# Patient Record
Sex: MALE | Race: WHITE | NOT HISPANIC OR LATINO | Employment: OTHER | URBAN - METROPOLITAN AREA
[De-identification: names, ages, dates, MRNs, and addresses within clinical notes are randomized per-mention and may not be internally consistent; named-entity substitution may affect disease eponyms.]

---

## 2019-01-11 ENCOUNTER — HOSPITAL ENCOUNTER (OUTPATIENT)
Dept: GASTROENTEROLOGY | Facility: HOSPITAL | Age: 64
Setting detail: HOSPITAL OUTPATIENT SURGERY
Discharge: HOME | End: 2019-01-11
Attending: INTERNAL MEDICINE

## 2019-03-08 ENCOUNTER — HOSPITAL ENCOUNTER (OUTPATIENT)
Dept: GENERAL RADIOLOGY | Facility: HOSPITAL | Age: 64
Discharge: HOME | End: 2019-03-08
Attending: INTERNAL MEDICINE

## 2020-04-07 ENCOUNTER — HOSPITAL ENCOUNTER (OUTPATIENT)
Dept: LAB | Facility: HOSPITAL | Age: 65
Discharge: HOME | End: 2020-04-07
Attending: INTERNAL MEDICINE

## 2020-04-09 LAB
CONV QUANTIFERON TB GOLD: NEGATIVE
QUANTIFERON CRITERIA: NORMAL
QUANTIFERON MITOGEN VALUE: >10 IU/ML
QUANTIFERON NIL VALUE: 0.07 IU/ML
QUANTIFERON TB1 AG VALUE: 0.08 IU/ML
QUANTIFERON TB2 AG VALUE: 0.07 IU/ML

## 2020-07-02 ENCOUNTER — HOSPITAL ENCOUNTER (OUTPATIENT)
Dept: GENERAL RADIOLOGY | Facility: HOSPITAL | Age: 65
Discharge: HOME | End: 2020-07-02
Attending: NURSE PRACTITIONER

## 2020-11-04 ENCOUNTER — HOSPITAL ENCOUNTER (OUTPATIENT)
Dept: LAB | Facility: HOSPITAL | Age: 65
Discharge: HOME | End: 2020-11-04
Attending: INTERNAL MEDICINE

## 2020-11-04 LAB
ALBUMIN SERPL-MCNC: 4.3 G/DL (ref 3.5–5)
ALBUMIN/GLOB SERPL: 1.7 {RATIO} (ref 1.4–2.6)
ALP SERPL-CCNC: 78 U/L (ref 56–155)
ALT SERPL-CCNC: 21 U/L (ref 10–40)
ANION GAP SERPL CALC-SCNC: 17 MMOL/L (ref 8–19)
AST SERPL-CCNC: 22 U/L (ref 15–50)
BASOPHILS # BLD AUTO: 0.03 10*3/UL (ref 0–0.2)
BASOPHILS NFR BLD AUTO: 0.5 % (ref 0–3)
BILIRUB SERPL-MCNC: 0.45 MG/DL (ref 0.2–1.3)
BUN SERPL-MCNC: 21 MG/DL (ref 5–25)
BUN/CREAT SERPL: 22 {RATIO} (ref 6–20)
CALCIUM SERPL-MCNC: 9.1 MG/DL (ref 8.7–10.4)
CHLORIDE SERPL-SCNC: 105 MMOL/L (ref 99–111)
CONV ABS IMM GRAN: 0.03 10*3/UL (ref 0–0.2)
CONV CO2: 24 MMOL/L (ref 22–32)
CONV IMMATURE GRAN: 0.5 % (ref 0–1.8)
CONV TOTAL PROTEIN: 6.9 G/DL (ref 6.3–8.2)
CREAT UR-MCNC: 0.95 MG/DL (ref 0.7–1.2)
CRP SERPL-MCNC: 6.4 MG/L (ref 0–5)
DEPRECATED RDW RBC AUTO: 46.6 FL (ref 35.1–43.9)
EOSINOPHIL # BLD AUTO: 0.11 10*3/UL (ref 0–0.7)
EOSINOPHIL # BLD AUTO: 1.7 % (ref 0–7)
ERYTHROCYTE [DISTWIDTH] IN BLOOD BY AUTOMATED COUNT: 13.8 % (ref 11.6–14.4)
ERYTHROCYTE [SEDIMENTATION RATE] IN BLOOD: 5 MM/H (ref 0–20)
GFR SERPLBLD BASED ON 1.73 SQ M-ARVRAT: >60 ML/MIN/{1.73_M2}
GLOBULIN UR ELPH-MCNC: 2.6 G/DL (ref 2–3.5)
GLUCOSE SERPL-MCNC: 83 MG/DL (ref 70–99)
HCT VFR BLD AUTO: 44.4 % (ref 42–52)
HGB BLD-MCNC: 14.4 G/DL (ref 14–18)
LYMPHOCYTES # BLD AUTO: 1.86 10*3/UL (ref 1–5)
LYMPHOCYTES NFR BLD AUTO: 29.3 % (ref 20–45)
MCH RBC QN AUTO: 29.8 PG (ref 27–31)
MCHC RBC AUTO-ENTMCNC: 32.4 G/DL (ref 33–37)
MCV RBC AUTO: 91.9 FL (ref 80–96)
MONOCYTES # BLD AUTO: 0.71 10*3/UL (ref 0.2–1.2)
MONOCYTES NFR BLD AUTO: 11.2 % (ref 3–10)
NEUTROPHILS # BLD AUTO: 3.61 10*3/UL (ref 2–8)
NEUTROPHILS NFR BLD AUTO: 56.8 % (ref 30–85)
NRBC CBCN: 0 % (ref 0–0.7)
OSMOLALITY SERPL CALC.SUM OF ELEC: 294 MOSM/KG (ref 273–304)
PLATELET # BLD AUTO: 247 10*3/UL (ref 130–400)
PMV BLD AUTO: 9.7 FL (ref 9.4–12.4)
POTASSIUM SERPL-SCNC: 4.6 MMOL/L (ref 3.5–5.3)
RBC # BLD AUTO: 4.83 10*6/UL (ref 4.7–6.1)
SODIUM SERPL-SCNC: 141 MMOL/L (ref 135–147)
WBC # BLD AUTO: 6.35 10*3/UL (ref 4.8–10.8)

## 2021-02-23 ENCOUNTER — HOSPITAL ENCOUNTER (OUTPATIENT)
Dept: VACCINE CLINIC | Facility: HOSPITAL | Age: 66
Discharge: HOME | End: 2021-02-23
Attending: INTERNAL MEDICINE

## 2021-03-19 ENCOUNTER — HOSPITAL ENCOUNTER (OUTPATIENT)
Dept: VACCINE CLINIC | Facility: HOSPITAL | Age: 66
Discharge: HOME | End: 2021-03-19
Attending: INTERNAL MEDICINE

## 2021-12-20 ENCOUNTER — HOSPITAL ENCOUNTER (OUTPATIENT)
Dept: GENERAL RADIOLOGY | Facility: HOSPITAL | Age: 66
Discharge: HOME | End: 2021-12-20
Admitting: FAMILY MEDICINE

## 2021-12-20 ENCOUNTER — TRANSCRIBE ORDERS (OUTPATIENT)
Dept: GENERAL RADIOLOGY | Facility: HOSPITAL | Age: 66
End: 2021-12-20

## 2021-12-20 DIAGNOSIS — R07.89 OTHER CHEST PAIN: ICD-10-CM

## 2021-12-20 DIAGNOSIS — R07.89 OTHER CHEST PAIN: Primary | ICD-10-CM

## 2021-12-20 PROCEDURE — 71101 X-RAY EXAM UNILAT RIBS/CHEST: CPT

## 2021-12-21 ENCOUNTER — TRANSCRIBE ORDERS (OUTPATIENT)
Dept: ADMINISTRATIVE | Facility: HOSPITAL | Age: 66
End: 2021-12-21

## 2021-12-21 DIAGNOSIS — R07.89 OTHER CHEST PAIN: Primary | ICD-10-CM

## 2022-01-05 ENCOUNTER — HOSPITAL ENCOUNTER (OUTPATIENT)
Dept: CT IMAGING | Facility: HOSPITAL | Age: 67
Discharge: HOME | End: 2022-01-05
Admitting: FAMILY MEDICINE

## 2022-01-05 DIAGNOSIS — R07.89 OTHER CHEST PAIN: ICD-10-CM

## 2022-01-05 LAB — CREAT BLDA-MCNC: 1.1 MG/DL

## 2022-01-05 PROCEDURE — 82565 ASSAY OF CREATININE: CPT

## 2022-01-05 PROCEDURE — 0 IOPAMIDOL PER 1 ML: Performed by: FAMILY MEDICINE

## 2022-01-05 PROCEDURE — 71260 CT THORAX DX C+: CPT

## 2022-01-05 RX ADMIN — IOPAMIDOL 100 ML: 755 INJECTION, SOLUTION INTRAVENOUS at 16:30

## 2022-08-03 ENCOUNTER — HOSPITAL ENCOUNTER (OUTPATIENT)
Dept: GENERAL RADIOLOGY | Facility: HOSPITAL | Age: 67
Discharge: HOME | End: 2022-08-03
Admitting: FAMILY MEDICINE

## 2022-08-03 ENCOUNTER — TRANSCRIBE ORDERS (OUTPATIENT)
Dept: GENERAL RADIOLOGY | Facility: HOSPITAL | Age: 67
End: 2022-08-03

## 2022-08-03 DIAGNOSIS — R22.1 LOCALIZED SWELLING, MASS AND LUMP, NECK: Primary | ICD-10-CM

## 2022-08-03 DIAGNOSIS — R22.1 LOCALIZED SWELLING, MASS AND LUMP, NECK: ICD-10-CM

## 2022-08-03 PROCEDURE — 73000 X-RAY EXAM OF COLLAR BONE: CPT

## 2022-08-31 ENCOUNTER — HOSPITAL ENCOUNTER (OUTPATIENT)
Dept: GENERAL RADIOLOGY | Facility: HOSPITAL | Age: 67
Discharge: HOME | End: 2022-08-31
Admitting: FAMILY MEDICINE

## 2022-08-31 ENCOUNTER — TRANSCRIBE ORDERS (OUTPATIENT)
Dept: GENERAL RADIOLOGY | Facility: HOSPITAL | Age: 67
End: 2022-08-31

## 2022-08-31 DIAGNOSIS — M17.11 UNILATERAL PRIMARY OSTEOARTHRITIS, RIGHT KNEE: Primary | ICD-10-CM

## 2022-08-31 DIAGNOSIS — M17.11 UNILATERAL PRIMARY OSTEOARTHRITIS, RIGHT KNEE: ICD-10-CM

## 2022-08-31 PROCEDURE — 73562 X-RAY EXAM OF KNEE 3: CPT

## 2022-11-16 ENCOUNTER — TRANSCRIBE ORDERS (OUTPATIENT)
Dept: LAB | Facility: HOSPITAL | Age: 67
End: 2022-11-16

## 2022-11-16 ENCOUNTER — LAB (OUTPATIENT)
Dept: LAB | Facility: HOSPITAL | Age: 67
End: 2022-11-16

## 2022-11-16 DIAGNOSIS — M45.AB NON-RADIOGRAPHIC AXIAL SPONDYLOARTHRITIS OF MULTIPLE SITES IN SPINE: ICD-10-CM

## 2022-11-16 DIAGNOSIS — M45.AB NON-RADIOGRAPHIC AXIAL SPONDYLOARTHRITIS OF MULTIPLE SITES IN SPINE: Primary | ICD-10-CM

## 2022-11-16 LAB
ALBUMIN SERPL-MCNC: 4.3 G/DL (ref 3.5–5.2)
ALBUMIN/GLOB SERPL: 2 G/DL
ALP SERPL-CCNC: 76 U/L (ref 39–117)
ALT SERPL W P-5'-P-CCNC: 11 U/L (ref 1–41)
ANION GAP SERPL CALCULATED.3IONS-SCNC: 9 MMOL/L (ref 5–15)
AST SERPL-CCNC: 19 U/L (ref 1–40)
BASOPHILS # BLD AUTO: 0.02 10*3/MM3 (ref 0–0.2)
BASOPHILS NFR BLD AUTO: 0.3 % (ref 0–1.5)
BILIRUB SERPL-MCNC: 0.3 MG/DL (ref 0–1.2)
BUN SERPL-MCNC: 16 MG/DL (ref 8–23)
BUN/CREAT SERPL: 20 (ref 7–25)
CALCIUM SPEC-SCNC: 9.3 MG/DL (ref 8.6–10.5)
CHLORIDE SERPL-SCNC: 105 MMOL/L (ref 98–107)
CO2 SERPL-SCNC: 28 MMOL/L (ref 22–29)
CREAT SERPL-MCNC: 0.8 MG/DL (ref 0.76–1.27)
CRP SERPL-MCNC: 0.4 MG/DL (ref 0–0.5)
DEPRECATED RDW RBC AUTO: 42.1 FL (ref 37–54)
EGFRCR SERPLBLD CKD-EPI 2021: 97.6 ML/MIN/1.73
EOSINOPHIL # BLD AUTO: 0.12 10*3/MM3 (ref 0–0.4)
EOSINOPHIL NFR BLD AUTO: 1.9 % (ref 0.3–6.2)
ERYTHROCYTE [DISTWIDTH] IN BLOOD BY AUTOMATED COUNT: 12.8 % (ref 12.3–15.4)
ERYTHROCYTE [SEDIMENTATION RATE] IN BLOOD: 11 MM/HR (ref 0–20)
GLOBULIN UR ELPH-MCNC: 2.2 GM/DL
GLUCOSE SERPL-MCNC: 86 MG/DL (ref 65–99)
HCT VFR BLD AUTO: 39.9 % (ref 37.5–51)
HGB BLD-MCNC: 13.3 G/DL (ref 13–17.7)
IMM GRANULOCYTES # BLD AUTO: 0.02 10*3/MM3 (ref 0–0.05)
IMM GRANULOCYTES NFR BLD AUTO: 0.3 % (ref 0–0.5)
LYMPHOCYTES # BLD AUTO: 1.97 10*3/MM3 (ref 0.7–3.1)
LYMPHOCYTES NFR BLD AUTO: 30.4 % (ref 19.6–45.3)
MCH RBC QN AUTO: 30.2 PG (ref 26.6–33)
MCHC RBC AUTO-ENTMCNC: 33.3 G/DL (ref 31.5–35.7)
MCV RBC AUTO: 90.5 FL (ref 79–97)
MONOCYTES # BLD AUTO: 0.76 10*3/MM3 (ref 0.1–0.9)
MONOCYTES NFR BLD AUTO: 11.7 % (ref 5–12)
NEUTROPHILS NFR BLD AUTO: 3.58 10*3/MM3 (ref 1.7–7)
NEUTROPHILS NFR BLD AUTO: 55.4 % (ref 42.7–76)
NRBC BLD AUTO-RTO: 0 /100 WBC (ref 0–0.2)
PLATELET # BLD AUTO: 242 10*3/MM3 (ref 140–450)
PMV BLD AUTO: 10 FL (ref 6–12)
POTASSIUM SERPL-SCNC: 4.8 MMOL/L (ref 3.5–5.2)
PROT SERPL-MCNC: 6.5 G/DL (ref 6–8.5)
RBC # BLD AUTO: 4.41 10*6/MM3 (ref 4.14–5.8)
SODIUM SERPL-SCNC: 142 MMOL/L (ref 136–145)
WBC NRBC COR # BLD: 6.47 10*3/MM3 (ref 3.4–10.8)

## 2022-11-16 PROCEDURE — 85652 RBC SED RATE AUTOMATED: CPT

## 2022-11-16 PROCEDURE — 36415 COLL VENOUS BLD VENIPUNCTURE: CPT

## 2022-11-16 PROCEDURE — 86140 C-REACTIVE PROTEIN: CPT

## 2022-11-16 PROCEDURE — 85025 COMPLETE CBC W/AUTO DIFF WBC: CPT

## 2022-11-16 PROCEDURE — 80053 COMPREHEN METABOLIC PANEL: CPT

## 2022-12-06 PROBLEM — M02.30 REACTIVE ARTHRITIS: Status: ACTIVE | Noted: 2020-03-07

## 2023-02-27 ENCOUNTER — HOSPITAL ENCOUNTER (OUTPATIENT)
Dept: GENERAL RADIOLOGY | Facility: HOSPITAL | Age: 68
Discharge: HOME | End: 2023-02-27
Admitting: FAMILY MEDICINE
Payer: MEDICARE

## 2023-02-27 ENCOUNTER — TRANSCRIBE ORDERS (OUTPATIENT)
Dept: GENERAL RADIOLOGY | Facility: HOSPITAL | Age: 68
End: 2023-02-27
Payer: MEDICARE

## 2023-02-27 DIAGNOSIS — M25.571 PAIN IN JOINT OF RIGHT ANKLE: Primary | ICD-10-CM

## 2023-02-27 DIAGNOSIS — M25.571 PAIN IN JOINT OF RIGHT ANKLE: ICD-10-CM

## 2023-02-27 PROCEDURE — 73610 X-RAY EXAM OF ANKLE: CPT

## 2023-04-18 ENCOUNTER — TRANSCRIBE ORDERS (OUTPATIENT)
Dept: ADMINISTRATIVE | Facility: HOSPITAL | Age: 68
End: 2023-04-18
Payer: MEDICARE

## 2023-04-18 DIAGNOSIS — M25.571 ACUTE RIGHT ANKLE PAIN: Primary | ICD-10-CM

## 2023-12-28 ENCOUNTER — PREP FOR SURGERY (OUTPATIENT)
Dept: OTHER | Facility: HOSPITAL | Age: 68
End: 2023-12-28
Payer: MEDICARE

## 2023-12-28 ENCOUNTER — CLINICAL SUPPORT (OUTPATIENT)
Dept: GASTROENTEROLOGY | Facility: CLINIC | Age: 68
End: 2023-12-28
Payer: MEDICARE

## 2023-12-28 DIAGNOSIS — Z86.010 HISTORY OF COLON POLYPS: ICD-10-CM

## 2023-12-28 DIAGNOSIS — Z12.11 ENCOUNTER FOR SCREENING FOR MALIGNANT NEOPLASM OF COLON: Primary | ICD-10-CM

## 2023-12-28 PROBLEM — Z86.0100 HISTORY OF COLON POLYPS: Status: ACTIVE | Noted: 2023-12-28

## 2023-12-28 RX ORDER — ERGOCALCIFEROL (VITAMIN D2) 10 MCG
400 TABLET ORAL DAILY
COMMUNITY

## 2023-12-28 RX ORDER — SODIUM, POTASSIUM,MAG SULFATES 17.5-3.13G
1 SOLUTION, RECONSTITUTED, ORAL ORAL EVERY 12 HOURS
Qty: 354 ML | Refills: 0 | Status: SHIPPED | OUTPATIENT
Start: 2023-12-28

## 2023-12-28 NOTE — PROGRESS NOTES
Candido Alfredo  1955  68 y.o.    Reason for call: Recall- 5 yr recall, hx colon polyps, last colon 2019- [facility]  Prep prescribed: Suprep  Prep instructions reviewed with patient and sent to patient via regular mail to the home address on file  Is the patient currently on any injectable medications for weight loss or diabetes? No  Clearance needed? No  If yes, what clearance is needed? N/A  Clearance has been requested from N/A  The patient has been scheduled for: Colonoscopy  After your procedure, you will be contacted with results. Please confirm the best phone # to reach the patient: [phone]  Family history of colon cancer? No  If yes, indicate relative: N/A  Family History   Problem Relation Age of Onset    Colon cancer Neg Hx      Past Medical History:   Diagnosis Date    Colon polyp     Hyperlipidemia 2023    Taking atorvastatin 40 mg one per day     No Known Allergies  Past Surgical History:   Procedure Laterality Date    COLONOSCOPY  5 years ago    JOINT REPLACEMENT      2009    TONSILLECTOMY      UPPER GASTROINTESTINAL ENDOSCOPY  Na    Slight heitial hernia     Social History     Socioeconomic History    Marital status:    Tobacco Use    Smoking status: Never     Passive exposure: Never    Smokeless tobacco: Never   Vaping Use    Vaping Use: Never used   Substance and Sexual Activity    Alcohol use: Not Currently    Drug use: Never    Sexual activity: Yes     Partners: Female     Birth control/protection: None       Current Outpatient Medications:     atorvastatin (LIPITOR) 40 MG tablet, , Disp: , Rfl:     Cimzia 2 X 200 MG/ML Prefilled Syringe Kit, , Disp: , Rfl:     diclofenac-miSOPROStol (Arthrotec) 50-0.2 MG EC tablet, Arthrotec 50  mg-mcg oral tablet,IR,delayed rel,biphasic take 1 tablet by oral route 2 times per day   Active, Disp: , Rfl:     loratadine (Claritin) 10 MG tablet, Claritin 10 mg oral tablet take 1 tablet (10 mg) by oral route once daily   Active, Disp: , Rfl:      methylPREDNISolone (MEDROL) 4 MG dose pack, Take as directed on package instructions., Disp: 21 tablet, Rfl: 0    tadalafil (Cialis) 10 MG tablet, Cialis 10 mg oral tablet take 1 tablet (10 mg) by oral route as needed approximately 1 hour before sexual activity   Active, Disp: , Rfl:     Vitamin D, Cholecalciferol, (CHOLECALCIFEROL) 10 MCG (400 UNIT) tablet, Take 1 tablet by mouth Daily. Patient takes 1 every other day, Disp: , Rfl:

## 2024-01-16 ENCOUNTER — TRANSCRIBE ORDERS (OUTPATIENT)
Dept: GENERAL RADIOLOGY | Facility: HOSPITAL | Age: 69
End: 2024-01-16
Payer: MEDICARE

## 2024-01-16 ENCOUNTER — HOSPITAL ENCOUNTER (OUTPATIENT)
Dept: GENERAL RADIOLOGY | Facility: HOSPITAL | Age: 69
Discharge: HOME | End: 2024-01-16
Admitting: INTERNAL MEDICINE
Payer: MEDICARE

## 2024-01-16 DIAGNOSIS — M79.641 PAIN IN RIGHT HAND: Primary | ICD-10-CM

## 2024-01-16 DIAGNOSIS — M79.641 PAIN IN RIGHT HAND: ICD-10-CM

## 2024-01-16 PROCEDURE — 73130 X-RAY EXAM OF HAND: CPT

## 2024-02-12 ENCOUNTER — ANESTHESIA EVENT (OUTPATIENT)
Dept: GASTROENTEROLOGY | Facility: HOSPITAL | Age: 69
End: 2024-02-12
Payer: MEDICARE

## 2024-02-13 ENCOUNTER — ANESTHESIA (OUTPATIENT)
Dept: GASTROENTEROLOGY | Facility: HOSPITAL | Age: 69
End: 2024-02-13
Payer: MEDICARE

## 2024-02-13 ENCOUNTER — HOSPITAL ENCOUNTER (OUTPATIENT)
Facility: HOSPITAL | Age: 69
Setting detail: HOSPITAL OUTPATIENT SURGERY
Discharge: HOME | End: 2024-02-13
Attending: INTERNAL MEDICINE | Admitting: INTERNAL MEDICINE
Payer: MEDICARE

## 2024-02-13 VITALS
TEMPERATURE: 97.5 F | DIASTOLIC BLOOD PRESSURE: 72 MMHG | RESPIRATION RATE: 20 BRPM | WEIGHT: 222.66 LBS | HEART RATE: 64 BPM | SYSTOLIC BLOOD PRESSURE: 105 MMHG | OXYGEN SATURATION: 96 % | BODY MASS INDEX: 31.95 KG/M2

## 2024-02-13 DIAGNOSIS — Z12.11 ENCOUNTER FOR SCREENING FOR MALIGNANT NEOPLASM OF COLON: ICD-10-CM

## 2024-02-13 DIAGNOSIS — Z86.010 HISTORY OF COLON POLYPS: ICD-10-CM

## 2024-02-13 PROCEDURE — 25010000002 PROPOFOL 10 MG/ML EMULSION: Performed by: NURSE ANESTHETIST, CERTIFIED REGISTERED

## 2024-02-13 PROCEDURE — 88305 TISSUE EXAM BY PATHOLOGIST: CPT | Performed by: INTERNAL MEDICINE

## 2024-02-13 PROCEDURE — 25810000003 LACTATED RINGERS PER 1000 ML

## 2024-02-13 RX ORDER — LIDOCAINE HYDROCHLORIDE 20 MG/ML
INJECTION, SOLUTION EPIDURAL; INFILTRATION; INTRACAUDAL; PERINEURAL AS NEEDED
Status: DISCONTINUED | OUTPATIENT
Start: 2024-02-13 | End: 2024-02-13 | Stop reason: SURG

## 2024-02-13 RX ORDER — SODIUM CHLORIDE, SODIUM LACTATE, POTASSIUM CHLORIDE, CALCIUM CHLORIDE 600; 310; 30; 20 MG/100ML; MG/100ML; MG/100ML; MG/100ML
30 INJECTION, SOLUTION INTRAVENOUS CONTINUOUS
Status: DISCONTINUED | OUTPATIENT
Start: 2024-02-13 | End: 2024-02-13 | Stop reason: HOSPADM

## 2024-02-13 RX ORDER — PROPOFOL 10 MG/ML
VIAL (ML) INTRAVENOUS AS NEEDED
Status: DISCONTINUED | OUTPATIENT
Start: 2024-02-13 | End: 2024-02-13 | Stop reason: SURG

## 2024-02-13 RX ADMIN — PROPOFOL 100 MG: 10 INJECTION, EMULSION INTRAVENOUS at 09:00

## 2024-02-13 RX ADMIN — LIDOCAINE HYDROCHLORIDE 100 MG: 20 INJECTION, SOLUTION EPIDURAL; INFILTRATION; INTRACAUDAL; PERINEURAL at 09:00

## 2024-02-13 RX ADMIN — PROPOFOL 250 MCG/KG/MIN: 10 INJECTION, EMULSION INTRAVENOUS at 09:00

## 2024-02-13 RX ADMIN — SODIUM CHLORIDE, POTASSIUM CHLORIDE, SODIUM LACTATE AND CALCIUM CHLORIDE 30 ML/HR: 600; 310; 30; 20 INJECTION, SOLUTION INTRAVENOUS at 07:55

## 2024-02-14 ENCOUNTER — TELEPHONE (OUTPATIENT)
Dept: GASTROENTEROLOGY | Facility: CLINIC | Age: 69
End: 2024-02-14
Payer: MEDICARE

## 2024-02-14 LAB
CYTO UR: NORMAL
LAB AP CASE REPORT: NORMAL
LAB AP CLINICAL INFORMATION: NORMAL
PATH REPORT.FINAL DX SPEC: NORMAL
PATH REPORT.GROSS SPEC: NORMAL

## 2024-12-27 PROBLEM — N52.01 ERECTILE DYSFUNCTION DUE TO ARTERIAL INSUFFICIENCY: Status: ACTIVE | Noted: 2024-12-27

## 2024-12-27 PROBLEM — R97.20 ELEVATED PSA: Status: ACTIVE | Noted: 2024-12-27

## 2024-12-27 NOTE — PROGRESS NOTES
Chief Complaint: Urologic complaint    Subjective         History of Present Illness  Candido Alfredo is a 69 y.o. male       Elevated PSA  ED      Voiding without issue.  No straining  On saw palmetto      Sildenafil 80 mg as needed.   Working okay    11/22 0.8, GFR is 97        No GH    No history of kidney  stone.    No urologic family history  Father with prostate cancer diagnosed in his 60s.    No cardiopulmonary history  Non-smoker  No anticoagulation            PSA    10/24     5.3, percent free 15(23% chance)  1/23       4.0  10/21     5.0              Objective     Past Medical History:   Diagnosis Date    Arthritis     Colon polyp     Hyperlipidemia 2023    Taking atorvastatin 40 mg one per day             Vital Signs:   There were no vitals taken for this visit.     Physical exam    Alert and orient x3  Well appearing, well developed, in no acute distress   Unlabored respirations  Nontender/nondistended      Grossly oriented to person, place and time, judgment is intact, normal mood and affect         Assessment and Plan    Diagnoses and all orders for this visit:    1. Elevated PSA (Primary)    2. Erectile dysfunction due to arterial insufficiency             Elevated PSA      Records reviewed and summarized in the chart      Patient with elevated PSA.  After discussion of risk and benefits we will go ahead and move forward with MRI of prostate to rule out underlying localizing lesion.  Patient voiced understanding           ED      Continue sildenafil as needed through PCP working well.      Follow-up after MRI

## 2024-12-31 ENCOUNTER — OFFICE VISIT (OUTPATIENT)
Dept: UROLOGY | Age: 69
End: 2024-12-31
Payer: MEDICARE

## 2024-12-31 VITALS — BODY MASS INDEX: 32.78 KG/M2 | WEIGHT: 229 LBS | HEIGHT: 70 IN

## 2024-12-31 DIAGNOSIS — N52.01 ERECTILE DYSFUNCTION DUE TO ARTERIAL INSUFFICIENCY: ICD-10-CM

## 2024-12-31 DIAGNOSIS — R97.20 ELEVATED PSA: Primary | ICD-10-CM

## 2024-12-31 PROCEDURE — 1160F RVW MEDS BY RX/DR IN RCRD: CPT | Performed by: UROLOGY

## 2024-12-31 PROCEDURE — 99203 OFFICE O/P NEW LOW 30 MIN: CPT | Performed by: UROLOGY

## 2024-12-31 PROCEDURE — 1159F MED LIST DOCD IN RCRD: CPT | Performed by: UROLOGY

## 2024-12-31 RX ORDER — MAG HYDROX/ALUMINUM HYD/SIMETH 400-400-40
2 SUSPENSION, ORAL (FINAL DOSE FORM) ORAL DAILY
COMMUNITY

## 2024-12-31 RX ORDER — SILDENAFIL CITRATE 20 MG/1
20 TABLET ORAL 4 TIMES DAILY PRN
COMMUNITY
Start: 2024-11-05

## 2024-12-31 RX ORDER — TRIAMCINOLONE ACETONIDE 1 MG/G
1 CREAM TOPICAL AS NEEDED
COMMUNITY
Start: 2024-11-06

## 2024-12-31 RX ORDER — SEMAGLUTIDE 2.4 MG/.75ML
INJECTION, SOLUTION SUBCUTANEOUS
COMMUNITY
Start: 2024-12-10

## 2025-02-17 NOTE — PROGRESS NOTES
Chief Complaint: Urologic complaint    Subjective         History of Present Illness  Candido Alfredo is a 69 y.o. male       Elevated PSA  ED      F/u Today after MRI prostate      Voiding ok   No straining  On saw palmetto      Sildenafil 80 mg as needed.   Working okay        No cardiopulmonary history  Non-smoker  No anticoagulation        Previous      11/22 0.8, GFR is 97    No history of kidney  stone.    No urologic family history  Father with prostate cancer diagnosed in his 60s.          PSA      2/6/2025 MRI prostate - 37 g, PSAd 0.14    PI - RADS 4 - right posterior lateral peripheral zone apex 1.2 x 1.4 cm  PI - RADS 4 - right posterior lateral peripheral zone mid - 0.9 x 0.8 cm  No KATY      10/24     5.3, percent free 15(23% chance)  1/23       4.0  10/21     5.0              Objective            Assessment and Plan    Diagnoses and all orders for this visit:    1. Elevated PSA (Primary)    2. Erectile dysfunction due to arterial insufficiency             Elevated PSA      Patient with a PI - RADS 4 lesion in his prostate I did recommend MRI fusion transrectal prostate biopsy.  Risks and benefits were discussed including bleeding, infection and damage to the urinary system.  We also discussed the risk of anesthesia up to and including death.  Patient voiced understanding and would like to proceed.  Discussed the natural history of prostate cancer and also prostate cancer screening.  We discussed his elevated PSA.  After risk and benefits were discussed the patient would like to proceed with prostate biopsy.  Risk of bleeding in the urine/semen/stool was discussed and also the 3% risk of sepsis.  We discussed the risk of severe rectal bleeding and also the risk of urinary retention. Patient voiced understanding and would like to proceed.    3 days ciprofloxacin given to be taken sophie-procedural        ED      Continue sildenafil as needed through PCP working well.

## 2025-02-21 ENCOUNTER — OFFICE VISIT (OUTPATIENT)
Dept: UROLOGY | Age: 70
End: 2025-02-21
Payer: MEDICARE

## 2025-02-21 ENCOUNTER — PREP FOR SURGERY (OUTPATIENT)
Dept: OTHER | Facility: HOSPITAL | Age: 70
End: 2025-02-21
Payer: MEDICARE

## 2025-02-21 VITALS — BODY MASS INDEX: 32.86 KG/M2 | WEIGHT: 229 LBS

## 2025-02-21 DIAGNOSIS — R97.20 ELEVATED PSA: Primary | ICD-10-CM

## 2025-02-21 DIAGNOSIS — R97.20 ELEVATED PROSTATE SPECIFIC ANTIGEN (PSA): Primary | ICD-10-CM

## 2025-02-21 DIAGNOSIS — N52.01 ERECTILE DYSFUNCTION DUE TO ARTERIAL INSUFFICIENCY: ICD-10-CM

## 2025-02-21 RX ORDER — SODIUM CHLORIDE 0.9 % (FLUSH) 0.9 %
3 SYRINGE (ML) INJECTION EVERY 12 HOURS SCHEDULED
OUTPATIENT
Start: 2025-02-21

## 2025-02-21 RX ORDER — SODIUM CHLORIDE 0.9 % (FLUSH) 0.9 %
10 SYRINGE (ML) INJECTION AS NEEDED
OUTPATIENT
Start: 2025-02-21

## 2025-02-21 RX ORDER — CIPROFLOXACIN 500 MG/1
500 TABLET, FILM COATED ORAL 2 TIMES DAILY
Qty: 6 TABLET | Refills: 0 | Status: SHIPPED | OUTPATIENT
Start: 2025-02-21

## 2025-02-21 RX ORDER — SODIUM CHLORIDE 9 MG/ML
40 INJECTION, SOLUTION INTRAVENOUS AS NEEDED
OUTPATIENT
Start: 2025-02-21

## 2025-03-07 ENCOUNTER — OFFICE VISIT (OUTPATIENT)
Dept: INTERNAL MEDICINE | Facility: CLINIC | Age: 70
End: 2025-03-07
Payer: MEDICARE

## 2025-03-07 VITALS
BODY MASS INDEX: 33.95 KG/M2 | WEIGHT: 237.13 LBS | HEIGHT: 70 IN | HEART RATE: 68 BPM | DIASTOLIC BLOOD PRESSURE: 78 MMHG | OXYGEN SATURATION: 95 % | RESPIRATION RATE: 18 BRPM | SYSTOLIC BLOOD PRESSURE: 122 MMHG | TEMPERATURE: 98.1 F

## 2025-03-07 DIAGNOSIS — M02.39 REACTIVE ARTHRITIS OF MULTIPLE SITES: ICD-10-CM

## 2025-03-07 DIAGNOSIS — R79.9 ABNORMAL FINDING OF BLOOD CHEMISTRY, UNSPECIFIED: ICD-10-CM

## 2025-03-07 DIAGNOSIS — R97.20 ELEVATED PROSTATE SPECIFIC ANTIGEN (PSA): ICD-10-CM

## 2025-03-07 DIAGNOSIS — E78.5 HYPERLIPIDEMIA, UNSPECIFIED HYPERLIPIDEMIA TYPE: Primary | ICD-10-CM

## 2025-03-07 LAB
ALBUMIN SERPL-MCNC: 4.4 G/DL (ref 3.5–5.2)
ALBUMIN/GLOB SERPL: 1.9 G/DL
ALP SERPL-CCNC: 72 U/L (ref 39–117)
ALT SERPL W P-5'-P-CCNC: 42 U/L (ref 1–41)
ANION GAP SERPL CALCULATED.3IONS-SCNC: 9 MMOL/L (ref 5–15)
AST SERPL-CCNC: 24 U/L (ref 1–40)
BASOPHILS # BLD AUTO: 0.04 10*3/MM3 (ref 0–0.2)
BASOPHILS NFR BLD AUTO: 0.6 % (ref 0–1.5)
BILIRUB SERPL-MCNC: 0.5 MG/DL (ref 0–1.2)
BUN SERPL-MCNC: 23 MG/DL (ref 8–23)
BUN/CREAT SERPL: 19.2 (ref 7–25)
CALCIUM SPEC-SCNC: 9.3 MG/DL (ref 8.6–10.5)
CHLORIDE SERPL-SCNC: 105 MMOL/L (ref 98–107)
CHOLEST SERPL-MCNC: 148 MG/DL (ref 0–200)
CO2 SERPL-SCNC: 26 MMOL/L (ref 22–29)
CREAT SERPL-MCNC: 1.2 MG/DL (ref 0.76–1.27)
DEPRECATED RDW RBC AUTO: 43.7 FL (ref 37–54)
EGFRCR SERPLBLD CKD-EPI 2021: 65.5 ML/MIN/1.73
EOSINOPHIL # BLD AUTO: 0.11 10*3/MM3 (ref 0–0.4)
EOSINOPHIL NFR BLD AUTO: 1.6 % (ref 0.3–6.2)
ERYTHROCYTE [DISTWIDTH] IN BLOOD BY AUTOMATED COUNT: 12.7 % (ref 12.3–15.4)
GLOBULIN UR ELPH-MCNC: 2.3 GM/DL
GLUCOSE SERPL-MCNC: 86 MG/DL (ref 65–99)
HBA1C MFR BLD: 5.5 % (ref 4.8–5.6)
HCT VFR BLD AUTO: 44.5 % (ref 37.5–51)
HDLC SERPL-MCNC: 64 MG/DL (ref 40–60)
HGB BLD-MCNC: 14.8 G/DL (ref 13–17.7)
IMM GRANULOCYTES # BLD AUTO: 0.02 10*3/MM3 (ref 0–0.05)
IMM GRANULOCYTES NFR BLD AUTO: 0.3 % (ref 0–0.5)
LDLC SERPL CALC-MCNC: 69 MG/DL (ref 0–100)
LDLC/HDLC SERPL: 1.07 {RATIO}
LYMPHOCYTES # BLD AUTO: 2.69 10*3/MM3 (ref 0.7–3.1)
LYMPHOCYTES NFR BLD AUTO: 40 % (ref 19.6–45.3)
MCH RBC QN AUTO: 30.9 PG (ref 26.6–33)
MCHC RBC AUTO-ENTMCNC: 33.3 G/DL (ref 31.5–35.7)
MCV RBC AUTO: 92.9 FL (ref 79–97)
MONOCYTES # BLD AUTO: 0.94 10*3/MM3 (ref 0.1–0.9)
MONOCYTES NFR BLD AUTO: 14 % (ref 5–12)
NEUTROPHILS NFR BLD AUTO: 2.92 10*3/MM3 (ref 1.7–7)
NEUTROPHILS NFR BLD AUTO: 43.5 % (ref 42.7–76)
NRBC BLD AUTO-RTO: 0 /100 WBC (ref 0–0.2)
PLATELET # BLD AUTO: 251 10*3/MM3 (ref 140–450)
PMV BLD AUTO: 10.5 FL (ref 6–12)
POTASSIUM SERPL-SCNC: 4.8 MMOL/L (ref 3.5–5.2)
PROT SERPL-MCNC: 6.7 G/DL (ref 6–8.5)
RBC # BLD AUTO: 4.79 10*6/MM3 (ref 4.14–5.8)
SODIUM SERPL-SCNC: 140 MMOL/L (ref 136–145)
TRIGL SERPL-MCNC: 77 MG/DL (ref 0–150)
TSH SERPL DL<=0.05 MIU/L-ACNC: 2.01 UIU/ML (ref 0.27–4.2)
VLDLC SERPL-MCNC: 15 MG/DL (ref 5–40)
WBC NRBC COR # BLD AUTO: 6.72 10*3/MM3 (ref 3.4–10.8)

## 2025-03-07 PROCEDURE — 85025 COMPLETE CBC W/AUTO DIFF WBC: CPT | Performed by: INTERNAL MEDICINE

## 2025-03-07 PROCEDURE — 84443 ASSAY THYROID STIM HORMONE: CPT | Performed by: INTERNAL MEDICINE

## 2025-03-07 PROCEDURE — 80053 COMPREHEN METABOLIC PANEL: CPT | Performed by: INTERNAL MEDICINE

## 2025-03-07 PROCEDURE — 83036 HEMOGLOBIN GLYCOSYLATED A1C: CPT | Performed by: INTERNAL MEDICINE

## 2025-03-07 PROCEDURE — 80061 LIPID PANEL: CPT | Performed by: INTERNAL MEDICINE

## 2025-03-07 RX ORDER — DICLOFENAC EPOLAMINE 0.01 G/1
1 SYSTEM TOPICAL 2 TIMES DAILY PRN
COMMUNITY

## 2025-03-07 NOTE — PROGRESS NOTES
"Chief Complaint  \[facility] [place] [facility]\"" Care (Has arthritis and sees a provider in [place], arthritis has flared up in left hip area and wants to make sure he can get patches prescribed here, has eyeritis and has to have a drop prescribed for it for a flare up, wants to discuss weight management, has tried wegovy and zepbound but was not pleased with them, Psa was elevated in Dec, having biopsy next month for the  prostate ) and Medicare Wellness-Initial Visit    Subjective      Candido Alfredo is a 70 y.o. male who presents to [facility]     Presenting to establish care    Has a history of reactive arthritis.  He sees a specialist in [place] but does use Flector patches as needed for pain.  He has a history of iritis and is followed by ophthalmology for this.    His PSA was elevated in December.  He is having a biopsy of the prostate in April.    Objective   Vital Signs:   Vitals:    03/07/25 1036   BP: 122/78   BP Location: Right arm   Patient Position: Sitting   Cuff Size: Adult   Pulse: 68   Resp: 18   Temp: 98.1 °F (36.7 °C)   TempSrc: Temporal   SpO2: 95%   Weight: 108 kg (237 lb 2 oz)   Height: 177.8 cm (70\")     Body mass index is 34.02 kg/m².    Wt Readings from Last 3 Encounters:   03/07/25 108 kg (237 lb 2 oz)   02/21/25 104 kg (229 lb)   12/31/24 104 kg (229 lb)     BP Readings from Last 3 Encounters:   03/07/25 122/78   02/13/24 105/72   11/09/23 141/80       Health Maintenance   Topic Date Due    LIPID PANEL  Never done    Pneumococcal Vaccine 50+ (1 of 1 - PCV) Never done    ZOSTER VACCINE (2 of 2) 11/21/2020    HEPATITIS C SCREENING  Never done    ANNUAL WELLNESS VISIT  10/20/2025    BMI FOLLOWUP  12/10/2025    COLORECTAL CANCER SCREENING  02/13/2029    TDAP/TD VACCINES (2 - Td or Tdap) 08/29/2032    COVID-19 Vaccine  Completed    INFLUENZA VACCINE  Completed       Physical Exam  Vitals reviewed.   Constitutional:       Appearance: Normal appearance. He " is well-developed.   HENT:      Head: Normocephalic and atraumatic.      Mouth/Throat:      Pharynx: No oropharyngeal exudate.   Eyes:      Conjunctiva/sclera: Conjunctivae normal.      Pupils: Pupils are equal, round, and reactive to light.   Neck:      Thyroid: No thyromegaly or thyroid tenderness.   Cardiovascular:      Rate and Rhythm: Normal rate and regular rhythm.      Heart sounds: No murmur heard.     No friction rub. No gallop.   Pulmonary:      Effort: Pulmonary effort is normal.      Breath sounds: Normal breath sounds. No wheezing or rhonchi.   Lymphadenopathy:      Cervical: No cervical adenopathy.   Skin:     General: Skin is warm and dry.   Neurological:      Mental Status: He is alert and oriented to person, place, and time.   Psychiatric:         Mood and Affect: Affect normal.          Result Review :  The following data was reviewed by: Arcelia Gonsalez MD on 03/07/2025:  CMP          3/7/2025    11:59   CMP   Glucose 86    BUN 23    Creatinine 1.20    EGFR 65.5    Sodium 140    Potassium 4.8    Chloride 105    Calcium 9.3    Total Protein 6.7    Albumin 4.4    Globulin 2.3    Total Bilirubin 0.5    Alkaline Phosphatase 72    AST (SGOT) 24    ALT (SGPT) 42    Albumin/Globulin Ratio 1.9    BUN/Creatinine Ratio 19.2    Anion Gap 9.0      CBC w/diff          3/7/2025    11:59   CBC w/Diff   WBC 6.72    RBC 4.79    Hemoglobin 14.8    Hematocrit 44.5    MCV 92.9    MCH 30.9    MCHC 33.3    RDW 12.7    Platelets 251    Neutrophil Rel % 43.5    Immature Granulocyte Rel % 0.3    Lymphocyte Rel % 40.0    Monocyte Rel % 14.0    Eosinophil Rel % 1.6    Basophil Rel % 0.6      Lipid Panel          3/7/2025    11:59   Lipid Panel   Total Cholesterol 148    Triglycerides 77    HDL Cholesterol 64    VLDL Cholesterol 15    LDL Cholesterol  69    LDL/HDL Ratio 1.07      TSH          3/7/2025    11:59   TSH   TSH 2.010      A1C Last 3 Results          3/7/2025    11:59   HGBA1C Last 3 Results   Hemoglobin  A1C 5.50             Procedures          Assessment & Plan  Hyperlipidemia, unspecified hyperlipidemia type  On statin, tolerating well  Checking follow-up labs today  Plan for follow-up in 3 months with labs.    Orders:    CBC & Differential    Comprehensive Metabolic Panel    Hemoglobin A1c    Lipid Panel    TSH    Abnormal finding of blood chemistry, unspecified    Orders:    Hemoglobin A1c    Elevated prostate specific antigen (PSA)  Having prostate biopsy next month.       Reactive arthritis of multiple sites  Followed by specialist in [place].  Will prescribe Flector patches as needed.  He will call for refills            FOLLOW UP  Return in about 4 months (around 7/7/2025) for Next scheduled follow up.  Patient was given instructions and counseling regarding his condition or for health maintenance advice. Please see specific information pulled into the AVS if appropriate.       Arcelia Gonsalez MD  03/07/25  11:06 EST    CURRENT & DISCONTINUED MEDICATIONS  Current Outpatient Medications   Medication Instructions    atorvastatin (LIPITOR) 40 MG tablet     Cimzia 2 X 200 MG/ML Prefilled Syringe Kit     ciprofloxacin (CIPRO) 500 mg, Oral, 2 Times Daily    diclofenac-miSOPROStol (ARTHROTEC 75) 75-0.2 MG EC tablet Take 1 tablet by mouth Daily. Patient reports taking 75 mg daily    loratadine (Claritin) 10 MG tablet Claritin 10 mg oral tablet take 1 tablet (10 mg) by oral route once daily   Active    methylPREDNISolone (MEDROL) 4 MG dose pack Take as directed on package instructions.    Saw Palmetto 450 MG capsule 2 capsules, Daily    sildenafil (REVATIO) 20 mg, 4 Times Daily PRN    triamcinolone (KENALOG) 0.1 % cream 1 Application, As Needed    Vitamin D (Cholecalciferol) (CHOLECALCIFEROL) 2,000 Units, Daily    Wegovy 2.4 MG/0.75ML solution auto-injector INJECT 2.4MG DOSE UNDER THE SKIN ONCE A WEEK AS DIRECTED.       There are no discontinued medications.

## 2025-03-19 ENCOUNTER — ANESTHESIA EVENT (OUTPATIENT)
Dept: PERIOP | Facility: HOSPITAL | Age: 70
End: 2025-03-19
Payer: MEDICARE

## 2025-03-20 ENCOUNTER — HOSPITAL ENCOUNTER (OUTPATIENT)
Facility: HOSPITAL | Age: 70
Discharge: HOME | End: 2025-03-20
Attending: UROLOGY | Admitting: UROLOGY
Payer: MEDICARE

## 2025-03-20 ENCOUNTER — ANESTHESIA (OUTPATIENT)
Dept: PERIOP | Facility: HOSPITAL | Age: 70
End: 2025-03-20
Payer: MEDICARE

## 2025-03-20 VITALS
TEMPERATURE: 96.6 F | SYSTOLIC BLOOD PRESSURE: 111 MMHG | DIASTOLIC BLOOD PRESSURE: 80 MMHG | BODY MASS INDEX: 33.77 KG/M2 | OXYGEN SATURATION: 95 % | WEIGHT: 235.89 LBS | RESPIRATION RATE: 16 BRPM | HEIGHT: 70 IN | HEART RATE: 69 BPM

## 2025-03-20 DIAGNOSIS — R97.20 ELEVATED PSA: Primary | ICD-10-CM

## 2025-03-20 DIAGNOSIS — R97.20 ELEVATED PROSTATE SPECIFIC ANTIGEN (PSA): ICD-10-CM

## 2025-03-20 PROCEDURE — 25010000002 LIDOCAINE PF 2% 2 % SOLUTION

## 2025-03-20 PROCEDURE — 25010000002 MIDAZOLAM PER 1MG: Performed by: ANESTHESIOLOGY

## 2025-03-20 PROCEDURE — 55700 PR PROSTATE NEEDLE BIOPSY ANY APPROACH: CPT | Performed by: UROLOGY

## 2025-03-20 PROCEDURE — 25010000002 PROPOFOL 10 MG/ML EMULSION

## 2025-03-20 PROCEDURE — 25810000003 LACTATED RINGERS PER 1000 ML: Performed by: ANESTHESIOLOGY

## 2025-03-20 PROCEDURE — 25010000002 CEFTRIAXONE PER 250 MG: Performed by: UROLOGY

## 2025-03-20 PROCEDURE — 76942 ECHO GUIDE FOR BIOPSY: CPT | Performed by: UROLOGY

## 2025-03-20 PROCEDURE — A9270 NON-COVERED ITEM OR SERVICE: HCPCS | Performed by: ANESTHESIOLOGY

## 2025-03-20 PROCEDURE — 63710000001 ACETAMINOPHEN EXTRA STRENGTH 500 MG TABLET: Performed by: ANESTHESIOLOGY

## 2025-03-20 PROCEDURE — 88305 TISSUE EXAM BY PATHOLOGIST: CPT | Performed by: UROLOGY

## 2025-03-20 RX ORDER — SODIUM CHLORIDE 0.9 % (FLUSH) 0.9 %
10 SYRINGE (ML) INJECTION AS NEEDED
Status: DISCONTINUED | OUTPATIENT
Start: 2025-03-20 | End: 2025-03-20 | Stop reason: HOSPADM

## 2025-03-20 RX ORDER — CIPROFLOXACIN 500 MG/1
500 TABLET, FILM COATED ORAL 2 TIMES DAILY
Qty: 4 TABLET | Refills: 0 | Status: SHIPPED | OUTPATIENT
Start: 2025-03-20

## 2025-03-20 RX ORDER — ONDANSETRON 4 MG/1
4 TABLET, ORALLY DISINTEGRATING ORAL ONCE AS NEEDED
Status: DISCONTINUED | OUTPATIENT
Start: 2025-03-20 | End: 2025-03-20 | Stop reason: HOSPADM

## 2025-03-20 RX ORDER — PROMETHAZINE HYDROCHLORIDE 12.5 MG/1
12.5 TABLET ORAL ONCE AS NEEDED
Status: DISCONTINUED | OUTPATIENT
Start: 2025-03-20 | End: 2025-03-20 | Stop reason: HOSPADM

## 2025-03-20 RX ORDER — SODIUM CHLORIDE, SODIUM LACTATE, POTASSIUM CHLORIDE, CALCIUM CHLORIDE 600; 310; 30; 20 MG/100ML; MG/100ML; MG/100ML; MG/100ML
9 INJECTION, SOLUTION INTRAVENOUS CONTINUOUS PRN
Status: DISCONTINUED | OUTPATIENT
Start: 2025-03-20 | End: 2025-03-20 | Stop reason: HOSPADM

## 2025-03-20 RX ORDER — PROMETHAZINE HYDROCHLORIDE 25 MG/1
25 TABLET ORAL ONCE AS NEEDED
Status: DISCONTINUED | OUTPATIENT
Start: 2025-03-20 | End: 2025-03-20 | Stop reason: HOSPADM

## 2025-03-20 RX ORDER — PROMETHAZINE HYDROCHLORIDE 25 MG/1
25 SUPPOSITORY RECTAL ONCE AS NEEDED
Status: DISCONTINUED | OUTPATIENT
Start: 2025-03-20 | End: 2025-03-20 | Stop reason: HOSPADM

## 2025-03-20 RX ORDER — ONDANSETRON 2 MG/ML
4 INJECTION INTRAMUSCULAR; INTRAVENOUS ONCE AS NEEDED
Status: DISCONTINUED | OUTPATIENT
Start: 2025-03-20 | End: 2025-03-20 | Stop reason: HOSPADM

## 2025-03-20 RX ORDER — SODIUM CHLORIDE 0.9 % (FLUSH) 0.9 %
3 SYRINGE (ML) INJECTION EVERY 12 HOURS SCHEDULED
Status: DISCONTINUED | OUTPATIENT
Start: 2025-03-20 | End: 2025-03-20 | Stop reason: HOSPADM

## 2025-03-20 RX ORDER — ACETAMINOPHEN 500 MG
1000 TABLET ORAL ONCE
Status: COMPLETED | OUTPATIENT
Start: 2025-03-20 | End: 2025-03-20

## 2025-03-20 RX ORDER — SODIUM CHLORIDE 9 MG/ML
40 INJECTION, SOLUTION INTRAVENOUS AS NEEDED
Status: DISCONTINUED | OUTPATIENT
Start: 2025-03-20 | End: 2025-03-20 | Stop reason: HOSPADM

## 2025-03-20 RX ORDER — MIDAZOLAM HYDROCHLORIDE 2 MG/2ML
1 INJECTION, SOLUTION INTRAMUSCULAR; INTRAVENOUS ONCE
Status: COMPLETED | OUTPATIENT
Start: 2025-03-20 | End: 2025-03-20

## 2025-03-20 RX ORDER — LIDOCAINE HYDROCHLORIDE 20 MG/ML
INJECTION, SOLUTION EPIDURAL; INFILTRATION; INTRACAUDAL; PERINEURAL AS NEEDED
Status: DISCONTINUED | OUTPATIENT
Start: 2025-03-20 | End: 2025-03-20 | Stop reason: SURG

## 2025-03-20 RX ORDER — PROPOFOL 10 MG/ML
VIAL (ML) INTRAVENOUS AS NEEDED
Status: DISCONTINUED | OUTPATIENT
Start: 2025-03-20 | End: 2025-03-20 | Stop reason: SURG

## 2025-03-20 RX ORDER — ACETAMINOPHEN 325 MG/1
650 TABLET ORAL ONCE
Status: DISCONTINUED | OUTPATIENT
Start: 2025-03-20 | End: 2025-03-20 | Stop reason: HOSPADM

## 2025-03-20 RX ORDER — OXYCODONE HYDROCHLORIDE 5 MG/1
5 TABLET ORAL
Status: DISCONTINUED | OUTPATIENT
Start: 2025-03-20 | End: 2025-03-20 | Stop reason: HOSPADM

## 2025-03-20 RX ADMIN — PROPOFOL 175 MCG/KG/MIN: 10 INJECTION, EMULSION INTRAVENOUS at 07:32

## 2025-03-20 RX ADMIN — LIDOCAINE HYDROCHLORIDE 40 MG: 20 INJECTION, SOLUTION EPIDURAL; INFILTRATION; INTRACAUDAL; PERINEURAL at 07:31

## 2025-03-20 RX ADMIN — MIDAZOLAM HYDROCHLORIDE 1 MG: 1 INJECTION, SOLUTION INTRAMUSCULAR; INTRAVENOUS at 06:57

## 2025-03-20 RX ADMIN — SODIUM CHLORIDE 2000 MG: 9 INJECTION INTRAMUSCULAR; INTRAVENOUS; SUBCUTANEOUS at 07:34

## 2025-03-20 RX ADMIN — PROPOFOL 80 MG: 10 INJECTION, EMULSION INTRAVENOUS at 07:31

## 2025-03-20 RX ADMIN — ACETAMINOPHEN 1000 MG: 500 TABLET ORAL at 06:53

## 2025-03-20 RX ADMIN — SODIUM CHLORIDE, SODIUM LACTATE, POTASSIUM CHLORIDE, AND CALCIUM CHLORIDE 9 ML/HR: .6; .31; .03; .02 INJECTION, SOLUTION INTRAVENOUS at 06:40

## 2025-03-20 NOTE — H&P
[facility]   UROLOGY HISTORY AND PHYSICAL    Patient Name: Candido Alfredo  : 1955  MRN: [id number]  Primary Care Physician:  Arcelia Gonsalez MD  Date of admission: 3/20/2025    Subjective   Subjective     Chief Complaint:     Elevated PSA    HPI:    Candido Alfredo is a 70 y.o. male     Elevated PSA    No change in H&P    Review of Systems     10 systems reviewed and are negative other than what is listed in HPI    Personal History     Past Medical History:   Diagnosis Date    Arthritis     TAKES CIMZIA    Benign prostatic hyperplasia     Biopisy on     Colon polyp     BENIGN    Elevated PSA     Erectile dysfunction     Take 4 selidifil’s before sex    HL (hearing loss)     Wear hearing aids    Hyperlipidemia     Taking atorvastatin 40 mg one per day       Past Surgical History:   Procedure Laterality Date    COLONOSCOPY  5 years ago    COLONOSCOPY N/A 2024    Procedure: COLONOSCOPY WITH POLYP BIOPSY AND POLYPECTOMY;  Surgeon: Pierce Arndt MD;  [facility];  Service: Gastroenterology;  Laterality: N/A;  COLON POLYPS, DIVERTICULOSIS    JOINT REPLACEMENT       L KNEE    TONSILLECTOMY      UPPER GASTROINTESTINAL ENDOSCOPY  Na    Slight heitial hernia       Family History: family history includes Cancer in his father and mother; Hearing loss in his father. Otherwise pertinent FHx was reviewed and not pertinent to current issue.    Social History:  reports that he has never smoked. He has never been exposed to tobacco smoke. He has never used smokeless tobacco. He reports that he does not currently use alcohol. He reports that he does not use drugs.    Home Medications:  Certolizumab Pegol, Diclofenac Epolamine, Saw Palmetto, Vitamin D (Cholecalciferol), atorvastatin, ciprofloxacin, diclofenac-miSOPROStol, loratadine, sildenafil, and triamcinolone      Allergies:  No Known Allergies    Objective   Objective     Vitals:    Temp:  [97.2 °F (36.2 °C)] 97.2 °F (36.2 °C)  Heart Rate:  [72] 72  Resp:  [18] 18  BP: (122)/(82) 122/82  Physical Exam    Constitutional: Awake, alert    Respiratory: Clear to auscultation bilaterally, nonlabored respirations    Cardiovascular: RRR, no murmurs, rubs, or gallops, palpable pedal pulses bilaterally   Gastrointestinal: Positive bowel sounds, soft, nontender, nondistended   Musculoskeletal: No bilateral ankle edema, no clubbing or cyanosis to extremities     Result Review    Result Review:  I have personally reviewed the results from the time of this admission to 3/20/2025 06:36 EDT and agree with these findings:  []  Laboratory  []  Microbiology  []  Radiology  []  EKG/Telemetry   []  Cardiology/Vascular   []  Pathology  []  Old records  []  Other:    Assessment & Plan   Assessment / Plan     Brief Patient Summary:  Candido Alfredo is a 70 y.o. male     Active Hospital Problems:  Active Hospital Problems    Diagnosis     **Elevated prostate specific antigen (PSA)     Elevated PSA          MRI fusion transrectal ultrasound-guided prostate biopsy  Risks and benefits were discussed including bleeding, infection and damage to the urinary system.  We also discussed the risk of anesthesia up to and including death.  Patient voiced understanding and would like to proceed.      Electronically signed by Sudhir Brown MD, 03/20/25, 6:36 AM EDT.

## 2025-03-20 NOTE — ANESTHESIA POSTPROCEDURE EVALUATION
Patient: Candido Alfredo    Procedure Summary       Date: 03/20/25 Room / Location: [facility] OR  / [facility] MAIN OR    Anesthesia Start: 0727 Anesthesia Stop: 0747    Procedure: MRI fusion transrectal ultrasound-guided prostate biopsy.  Biopsy prostate through rectum Diagnosis:       Elevated prostate specific antigen (PSA)      (Elevated prostate specific antigen (PSA) [R97.20])    Surgeons: Sudhir Brown MD Provider: Madhav Olivo MD    Anesthesia Type: general ASA Status: 2            Anesthesia Type: general    Vitals  Vitals Value Taken Time   BP 95/73 03/20/25 08:06   Temp 36.4 °C (97.5 °F) 03/20/25 08:05   Pulse 72 03/20/25 08:06   Resp 17 03/20/25 08:05   SpO2 96 % 03/20/25 08:06   Vitals shown include unfiled device data.        Post Anesthesia Care and Evaluation    Patient location during evaluation: bedside  Patient participation: complete - patient participated  Level of consciousness: awake  Pain management: adequate    Airway patency: patent  PONV Status: none  Cardiovascular status: acceptable and stable  Respiratory status: acceptable  Hydration status: acceptable

## 2025-03-20 NOTE — OP NOTE
PROSTATE ULTRASOUND BIOPSY MRI FUSION WITH URONAV  Procedure Report    Patient Name:  Candido Alfredo  YOB: 1955    Date of Surgery:  3/20/2025      Pre-op Diagnosis:   Elevated prostate specific antigen (PSA) [R97.20]       Postop diagnosis:    Same    Procedure/CPT® Codes:  No CPT Code Applied in Case Entry    Procedure(s):    MRI fusion transrectal ultrasound-guided prostate biopsy.     Staff:  Surgeon(s):  Sudhir Brown MD         Anesthesia: Monitored Anesthesia Care    Estimated Blood Loss: none    Implants:    Nothing was implanted during the procedure    Specimen:          Specimens       ID Source Type Tests Collected By Collected At Frozen?    A Prostate Tissue TISSUE PATHOLOGY EXAM   Sudhir Brown MD 3/20/25 0734     Description: RIGHT APEX X2    This specimen was not marked as sent.    B Prostate Tissue TISSUE PATHOLOGY EXAM   Sudhir Brown MD 3/20/25 0735     Description: LEFT APEX X2    This specimen was not marked as sent.    C Prostate Tissue TISSUE PATHOLOGY EXAM   Sudhir Brown MD 3/20/25 0734     Description: RIGHT MID X2    This specimen was not marked as sent.    D Prostate Tissue TISSUE PATHOLOGY EXAM   Sudhir Brown MD 3/20/25 0735     Description: LEFT MID X2    This specimen was not marked as sent.    E Prostate Tissue TISSUE PATHOLOGY EXAM   Sudhir Brown MD 3/20/25 0734     Description: RIGHT BASE X2    This specimen was not marked as sent.    F Prostate Tissue TISSUE PATHOLOGY EXAM   Sudhir Brown MD 3/20/25 0735     Description: LEFT BASE X2    This specimen was not marked as sent.    G Prostate Tissue TISSUE PATHOLOGY EXAM   Sudhir Brown MD 3/20/25 0736     Description: REGION OF INTEREST 1  X3    This specimen was not marked as sent.    H Prostate Tissue TISSUE PATHOLOGY EXAM   Sudhir Brown MD 3/20/25 0737     Description: REGION OF INTEREST 2  X3    This specimen was not marked as sent.               Findings -  none    Complications: none    Description of Procedure:     After informed consent patient was taken to the operating room.  Patient was laid supine and placed under monitored anesthesia care by the anesthesia team.  At this point a multidisciplinary timeout was undertaken documenting the correct patient site and procedure.  Patient was laid on his left side down in fetal position.  Ultrasound probe was placed into the rectum and the prostate was visualized without issue.  A sweep was done from base to apex to link the real-time ultrasound to the MRI data.  The region of interest was identified and biopsies were taken from the region of interest.  I then did 12 systematic biopsies starting on the left side.  2 from the base, 2 from the mid and 2 from the apex.  These were done medially and laterally.  I then did the right side in the same fashion.  Patient tolerated the procedure well.  There were no intraoperative complications.  Minimal bleeding from the rectum.  Patient was awakened and taken to the postanesthesia care unit without problem.          Sudhir Brown MD     Date: 3/20/2025  Time: 07:41 EDT

## 2025-03-20 NOTE — ANESTHESIA PREPROCEDURE EVALUATION
Anesthesia Evaluation     Patient summary reviewed and Nursing notes reviewed   no history of anesthetic complications:   NPO Solid Status: > 8 hours  NPO Liquid Status: > 2 hours           Airway   Mallampati: II  TM distance: >3 FB  Neck ROM: full  No difficulty expected  Dental - normal exam     Pulmonary - negative pulmonary ROS and normal exam    breath sounds clear to auscultation  Cardiovascular - normal exam  Exercise tolerance: good (4-7 METS)    Rhythm: regular  Rate: normal    (+) hyperlipidemia      Neuro/Psych- negative ROS  GI/Hepatic/Renal/Endo    (+) obesity    Musculoskeletal     Abdominal    Substance History - negative use     OB/GYN negative ob/gyn ROS         Other   arthritis,     ROS/Med Hx Other: PAT Nursing Notes unavailable.     Patient was on a GLP1 but stopped in Jan              Anesthesia Plan    ASA 2     general     (Patient understands anesthesia not responsible for dental damage.)  intravenous induction     Anesthetic plan, risks, benefits, and alternatives have been provided, discussed and informed consent has been obtained with: patient.    Use of blood products discussed with patient .    Plan discussed with CRNA.    CODE STATUS:

## 2025-03-20 NOTE — DISCHARGE INSTRUCTIONS
Discharge Instructions Prostate Biopsy       For your surgery you had:  Local anesthesia  Monitored anesthesia care  You may experience dizziness, drowsiness, or lightheadedness for several hours following surgery.  Do not stay alone today or tonight.  Limit your activity for 24 hours.  You should not drive, operate machinery, drink alcohol, or sign legally binding documents for 24 hours or while you are taking pain medication.  Resume your diet slowly.  Follow any special dietary instructions you may have been given by your doctor.    NOTIFY YOUR DOCTOR IF YOU EXPERIENCE ANY OF THE FOLLOWING:  Temperature greater than 101 degrees Fahrenheit  Shaking Chills  Redness or excessive drainage from incision  Nausea, vomiting and/or pain that is not controlled by prescribed medications  Increase in bleeding or bleeding that is excessive  Unable to urinate in 6 hours after surgery  If unable to reach your doctor, please go to the closest Emergency Room.   Drink 4-6 glasses of water a day for 3-4 days  You may see blood in your urine for up to a week, blood in your stool for 3-4 days, and blood in semen for up to a month  If you are passing large clots, call your doctor  Avoid lifting or straining for 48 hours  It is normal to experience burning during urination for 48 hours after biopsy  Call your doctor if pain, burning, urgency, or frequency of urination persist beyond 48 hours    SPECIAL INSTRUCTIONS:  Follow any verbal instructions given by Dr. Brown.    Last dose of pain medication given at:  Tylenol (1000mg) last at 6:53am. Do not exceed 4000mg of tylenol in a 24 hour period. May take tylenol next at 12:53pm if needed.  Continue antibiotic tomorrow. You had an IV antibiotic today during your procedure.

## 2025-03-21 ENCOUNTER — TELEPHONE (OUTPATIENT)
Dept: UROLOGY | Age: 70
End: 2025-03-21
Payer: MEDICARE

## 2025-03-21 PROBLEM — E78.5 HYPERLIPIDEMIA: Status: ACTIVE | Noted: 2025-03-21

## 2025-03-21 NOTE — ASSESSMENT & PLAN NOTE
Followed by specialist in [place].  Will prescribe Flector patches as needed.  He will call for refills

## 2025-03-21 NOTE — ASSESSMENT & PLAN NOTE
On statin, tolerating well  Checking follow-up labs today  Plan for follow-up in 3 months with labs.    Orders:    CBC & Differential    Comprehensive Metabolic Panel    Hemoglobin A1c    Lipid Panel    TSH

## 2025-03-21 NOTE — TELEPHONE ENCOUNTER
ROSAURA CALLED FROM PATHOLOGY.  PROSTATE GLAND:  ADENOCARCINOMA.   [Arthralgia] : arthralgia [Joint Pain] : joint pain

## 2025-03-21 NOTE — ASSESSMENT & PLAN NOTE
"History  Chief Complaint   Patient presents with    Hypertension     Patient c/o high blood pressure and joint pain mostly in right shoulder, both knees and left hand.      86-year-old female presents emergency department from home for evaluation.  Patient states she presented today due to \"I couldn't walk right\".  States she could not walk right due to the pain in her knees and weakness in the left leg.  Reports she has pain in both knees but worse in the left knee. Reports chronic pain in the right shoulder.  Also reports she has persistently dropped things from the left hand today due to weakness in the hand.  Reports a pins and needle sensation in the left hand and arm.  States she woke up with the symptoms.  Reports \"I thought I slept wrong but it is not going away\" patient was hypertensive on arrival.  States he takes her blood pressure medications daily.  Also admits to taking Plavix and aspirin.  There is a questionable history of stroke, patient states she has never had a stroke however daughter states she had.  She denies any headaches or visual changes.  States she always has \"fuzzy vision\" denies any loss of vision or double vision.  Denies any speech changes.  Denies any facial asymmetry.  Patient denies any chest pain palpitations or shortness of breath.  She denies any headaches.  Patient states she felt fine yesterday.  She denies any recent cough, congestion, fevers or chills.  No recent rashes.  No joint swelling.        Prior to Admission Medications   Prescriptions Last Dose Informant Patient Reported? Taking?   amLODIPine (NORVASC) 5 mg tablet 2024  Yes Yes   Sig: Take 5 mg by mouth daily   aspirin (ECOTRIN LOW STRENGTH) 81 mg EC tablet 2024  Yes Yes   Sig: Take 81 mg by mouth daily   atorvastatin (LIPITOR) 40 mg tablet 2024  No Yes   Sig: Take 1 tablet (40 mg total) by mouth every evening   cloNIDine (CATAPRES-TTS-3) 0.3 mg/24 hr 2024  Yes Yes   Si.3 patches   clopidogrel " Having prostate biopsy next month.        (PLAVIX) 75 mg tablet Not Taking  No No   Sig: Take 1 tablet (75 mg total) by mouth daily   Patient not taking: Reported on 5/2/2024   losartan (COZAAR) 50 mg tablet 5/2/2024  Yes Yes   Sig: Take 50 mg by mouth daily      Facility-Administered Medications: None       Past Medical History:   Diagnosis Date    Hypertension     Psoriasis     Stroke (HCC)        History reviewed. No pertinent surgical history.    History reviewed. No pertinent family history.  I have reviewed and agree with the history as documented.    E-Cigarette/Vaping    E-Cigarette Use Never User      E-Cigarette/Vaping Substances     Social History     Tobacco Use    Smoking status: Never    Smokeless tobacco: Never   Vaping Use    Vaping status: Never Used   Substance Use Topics    Alcohol use: Never    Drug use: Never       Review of Systems   Constitutional:  Negative for appetite change, fatigue and fever.   Eyes:  Negative for visual disturbance.   Respiratory: Negative.     Cardiovascular: Negative.    Gastrointestinal: Negative.    Musculoskeletal:  Positive for arthralgias. Negative for joint swelling.   Skin: Negative.    Neurological:  Positive for weakness and numbness.   All other systems reviewed and are negative.      Physical Exam  Physical Exam  Vitals and nursing note reviewed.   Constitutional:       General: She is not in acute distress.     Appearance: Normal appearance. She is not ill-appearing, toxic-appearing or diaphoretic.   HENT:      Head: Normocephalic.      Nose: Nose normal.   Eyes:      Extraocular Movements: Extraocular movements intact.      Conjunctiva/sclera: Conjunctivae normal.      Pupils: Pupils are equal, round, and reactive to light.      Comments: Normal gross vision   Cardiovascular:      Rate and Rhythm: Normal rate and regular rhythm.   Pulmonary:      Effort: Pulmonary effort is normal.      Breath sounds: Normal breath sounds. No stridor. No wheezing, rhonchi or rales.   Chest:      Chest wall: No  tenderness.   Abdominal:      General: Bowel sounds are normal. There is no distension.      Palpations: Abdomen is soft.      Tenderness: There is no abdominal tenderness.   Musculoskeletal:      Cervical back: Normal range of motion.      Comments: Decreased  strength in the left upper extremity, decreased ability to push and pull with the left upper extremity.  Equal strength in bilateral lower extremities.  Decreased range of motion at the left knee.  No tenderness to palpation of the knee.  No redness or warmth.  No bony tenderness   Skin:     General: Skin is warm and dry.      Capillary Refill: Capillary refill takes less than 2 seconds.      Findings: No bruising or erythema.   Neurological:      Mental Status: She is alert and oriented to person, place, and time.      GCS: GCS eye subscore is 4. GCS verbal subscore is 5. GCS motor subscore is 6.      Cranial Nerves: No facial asymmetry.      Sensory: Sensation is intact.      Motor: Weakness present. No tremor or pronator drift.      Coordination: Finger-Nose-Finger Test normal.      Gait: Gait is intact.      Comments: Decreased sensation in the left upper extremity in comparison to the rt  Decreased  strength in the left upper extremity, decreased ability to push and pull with the left upper extremity.         Vital Signs  ED Triage Vitals   Temperature Pulse Respirations Blood Pressure SpO2   05/02/24 1705 05/02/24 1705 05/02/24 1705 05/02/24 1705 05/02/24 1705   98.2 °F (36.8 °C) 89 18 (!) 232/106 97 %      Temp Source Heart Rate Source Patient Position - Orthostatic VS BP Location FiO2 (%)   05/02/24 1705 05/02/24 1705 05/02/24 1705 05/02/24 1705 --   Temporal Monitor Sitting Right arm       Pain Score       05/02/24 2041       4           Vitals:    05/02/24 2010 05/02/24 2015 05/02/24 2028 05/02/24 2033   BP:   (!) 196/99 (!) 196/99   Pulse: 85 86 89 92   Patient Position - Orthostatic VS:             Visual Acuity  Visual Acuity       Flowsheet Row Most Recent Value   L Pupil Size (mm) 3   R Pupil Size (mm) 3            ED Medications  Medications   iohexol (OMNIPAQUE) 350 MG/ML injection (MULTI-DOSE) 85 mL (85 mL Intravenous Given 5/2/24 1736)   labetalol (NORMODYNE) injection 10 mg (10 mg Intravenous Given 5/2/24 1902)       Diagnostic Studies  Results Reviewed       Procedure Component Value Units Date/Time    HS Troponin I 2hr [[id number]]  (Normal) Collected: 05/02/24 1836    Lab Status: Final result Specimen: Blood from Arm, Right Updated: 05/02/24 1912     hs TnI 2hr 4 ng/L      Delta 2hr hsTnI -1 ng/L     Lyme Total AB W Reflex to IGM/IGG [[id number]] Collected: 05/02/24 1836    Lab Status: In process Specimen: Blood from Arm, Right Updated: 05/02/24 1839    Narrative:      The following orders were created for panel order Lyme Total AB W Reflex to IGM/IGG.  Procedure                               Abnormality         Status                     ---------                               -----------         ------                     Lyme Total AB W Reflex t...[[id number]]                      In process                   Please view results for these tests on the individual orders.    Lyme Total AB W Reflex to IGM/IGG [[id number]] Collected: 05/02/24 1836    Lab Status: In process Specimen: Blood from Arm, Right Updated: 05/02/24 1839    FLU/RSV/COVID - if FLU/RSV clinically relevant [[id number]]  (Normal) Collected: 05/02/24 1728    Lab Status: Final result Specimen: Nares from Nasopharyngeal Swab Updated: 05/02/24 1812     SARS-CoV-2 Negative     INFLUENZA A PCR Negative     INFLUENZA B PCR Negative     RSV PCR Negative    Narrative:      FOR PEDIATRIC PATIENTS - copy/paste COVID Guidelines URL to browser: [email or web address]    SARS-CoV-2 assay is a Nucleic Acid Amplification assay intended for the  qualitative detection of nucleic acid from SARS-CoV-2 in nasopharyngeal  swabs. Results  are for the presumptive identification of SARS-CoV-2 RNA.    Positive results are indicative of infection with SARS-CoV-2, the virus  causing COVID-19, but do not rule out bacterial infection or co-infection  with other viruses. Laboratories within the [place] and its  territories are required to report all positive results to the appropriate  public health authorities. Negative results do not preclude SARS-CoV-2  infection and should not be used as the sole basis for treatment or other  patient management decisions. Negative results must be combined with  clinical observations, patient history, and epidemiological information.  This test has not been FDA cleared or approved.    This test has been authorized by FDA under an Emergency Use Authorization  (EUA). This test is only authorized for the duration of time the  declaration that circumstances exist justifying the authorization of the  emergency use of an in vitro diagnostic tests for detection of SARS-CoV-2  virus and/or diagnosis of COVID-19 infection under section 564(b)(1) of  the Act, 21 U.S.C. 360bbb-3(b)(1), unless the authorization is terminated  or revoked sooner. The test has been validated but independent review by FDA  and CLIA is pending.    Test performed using [employer] GeneXpert: This RT-PCR assay targets N2,  a region unique to SARS-CoV-2. A conserved region in the E-gene was chosen  for pan-Sarbecovirus detection which includes SARS-CoV-2.    According to CMS-2020-01-R, this platform meets the definition of high-throughput technology.    HS Troponin 0hr (reflex protocol) [[id number]]  (Normal) Collected: 05/02/24 1728    Lab Status: Final result Specimen: Blood from Arm, Right Updated: 05/02/24 1807     hs TnI 0hr 5 ng/L     Basic metabolic panel [[id number]] Collected: 05/02/24 1728    Lab Status: Final result Specimen: Blood from Arm, Right Updated: 05/02/24 1803     Sodium 138 mmol/L      Potassium 4.0 mmol/L      Chloride 106 mmol/L       CO2 25 mmol/L      ANION GAP 7 mmol/L      BUN 15 mg/dL      Creatinine 0.82 mg/dL      Glucose 112 mg/dL      Calcium 9.3 mg/dL      eGFR 65 ml/min/1.73sq m     Narrative:      National Kidney Disease Foundation guidelines for Chronic Kidney Disease (CKD):     Stage 1 with normal or high GFR (GFR > 90 mL/min/1.73 square meters)    Stage 2 Mild CKD (GFR = 60-89 mL/min/1.73 square meters)    Stage 3A Moderate CKD (GFR = 45-59 mL/min/1.73 square meters)    Stage 3B Moderate CKD (GFR = 30-44 mL/min/1.73 square meters)    Stage 4 Severe CKD (GFR = 15-29 mL/min/1.73 square meters)    Stage 5 End Stage CKD (GFR <15 mL/min/1.73 square meters)  Note: GFR calculation is accurate only with a steady state creatinine    Protime-INR [[id number]]  (Normal) Collected: 05/02/24 1728    Lab Status: Final result Specimen: Blood from Arm, Right Updated: 05/02/24 1756     Protime 13.5 seconds      INR 1.00    APTT [[id number]]  (Abnormal) Collected: 05/02/24 1728    Lab Status: Final result Specimen: Blood from Arm, Right Updated: 05/02/24 1756     PTT 22 seconds     CBC and Platelet [[id number]]  (Normal) Collected: 05/02/24 1728    Lab Status: Final result Specimen: Blood from Arm, Right Updated: 05/02/24 1733     WBC 9.18 Thousand/uL      RBC 4.09 Million/uL      Hemoglobin 12.2 g/dL      Hematocrit 36.2 %      MCV 89 fL      MCH 29.8 pg      MCHC 33.7 g/dL      RDW 13.5 %      Platelets 235 Thousands/uL      MPV 11.1 fL     Fingerstick Glucose (POCT) [[id number]]  (Normal) Collected: 05/02/24 1725    Lab Status: Final result Specimen: Blood Updated: 05/02/24 1726     POC Glucose 119 mg/dl                    CTA stroke alert (head/neck)   Final Result by Xu Joe MD (05/02 1758)      No significant interval change since prior examination.      No evidence for major branch vessel occlusion of the anterior circulation.      Diffuse atherosclerotic irregularity with areas of stenosis involving both the anterior and posterior circulation  as described above.      Findings were directly discussed with Hang Goncalves at 5:40 p.m.                           Workstation performed: [id number]         CT stroke alert brain   Final Result by Xu Joe MD (05/02 1747)      No mass effect, acute intracranial hemorrhage or evidence of recent infarction.      Findings were directly discussed with Hang Goncalves at approximately 5:40 p.m.      Workstation performed: [id number]         XR knee 4+ vw left injury    (Results Pending)              Procedures  ECG 12 Lead Documentation Only    Date/Time: 5/2/2024 5:21 PM    Performed by: Maggy Coffey PA-C  Authorized by: Maggy Coffey PA-C    Patient location:  ED  Interpretation:     Interpretation: non-specific    Rate:     ECG rate:  98    ECG rate assessment: normal    Rhythm:     Rhythm: sinus rhythm    Ectopy:     Ectopy: PVCs    QRS:     QRS axis:  Left    QRS intervals:  Normal  Conduction:     Conduction: normal             ED Course  ED Course as of 05/02/24 2114   Thu May 02, 2024   1726 Upon my evaluation stroke alert called.   1756 CTA:    No significant interval change since prior examination.     No evidence for major branch vessel occlusion of the anterior circulation.     Diffuse atherosclerotic irregularity with areas of stenosis involving both the anterior and posterior circulation as described above.        1756 CT head: No mass effect, acute intracranial hemorrhage or evidence of recent infarction.   1856 WBC: 9.18   1856 POC Glucose: 119   1857 FLU/RSV/COVID - if FLU/RSV clinically relevant  negative   1857 hs TnI 0hr: 5   1857 Basic metabolic panel  Unremarkable    1917 Delta 2hr hsTnI: -1   1936 Dr. Ye - neurology recommended admission for stroke pathway. Continue aspirin and plavix for now.    1936 Patient and family agreeable wit this treatment plan   1946 TT sent to medicine requesting admission   1951 Accepted to medicine for observation                   Stroke Assessment        Row Name 05/02/24 1727             NIH Stroke Scale    Interval Baseline      Level of Consciousness (1a.) 0      LOC Questions (1b.) 0      LOC Commands (1c.) 0      Best Gaze (2.) 0      Visual (3.) 0      Facial Palsy (4.) 0      Motor Arm, Left (5a.) 0      Motor Arm, Right (5b.) 0      Motor Leg, Left (6a.) 0      Motor Leg, Right (6b.) 0      Limb Ataxia (7.) 0      Sensory (8.) 1      Best Language (9.) 0      Dysarthria (10.) 0      Extinction and Inattention (11.) (Formerly Neglect) 0      Total 1                                              Medical Decision Making  86-year-old female presented to the emergency department from home for  left sided weakness. pain in both knees L>R. said she was dropping things from the left hand today and difficulty walking due to left leg problems.  Vitals and medical record reviewed.  Patient at risk for the following but not limited to CVA, TIA, subdural hematoma, subarachnoid hemorrhage, lyme disease, HTN emergency. Hypertensive on arrival which improved with labetalol. Stoke eval on arrival. CT and CTA without acute findings. NIH 1.  Laboratory findings were relatively unremarkable.  ED interpretation of the x-ray negative for acute osseous injury.  Patient is able to ambulate.  States she continues with left hand weakness.  Neuro recommended admit for [facility] [place], [place] pathway continue with currently prescribed asa and plavix.  Patient was agreeable to this treatment plan    Problems Addressed:  Hypertension: acute illness or injury  Stroke-like symptom: acute illness or injury    Amount and/or Complexity of Data Reviewed  Labs: ordered. Decision-making details documented in ED Course.  Radiology: ordered.    Risk  Prescription drug management.  Decision regarding hospitalization.             Disposition  Final diagnoses:   Stroke-like symptom   Hypertension     Time reflects when diagnosis was documented in both MDM as applicable and the Disposition within this note        Time User Action Codes Description Comment    5/2/2024  5:26 PM Maggy Coffey [R29.90] Stroke-like symptom     5/2/2024  7:52 PM Maggy Coffey [I10] Hypertension           ED Disposition       ED Disposition   Admit    Condition   Stable    Date/Time   Thu May 2, 2024  7:52 PM    Comment   Case was discussed with Dr. escobar and the patient's admission status was agreed to be Admission Status: observation status to the service of Dr. escobar .               Follow-up Information    None         Current Discharge Medication List        CONTINUE these medications which have NOT CHANGED    Details   amLODIPine (NORVASC) 5 mg tablet Take 5 mg by mouth daily      aspirin (ECOTRIN LOW STRENGTH) 81 mg EC tablet Take 81 mg by mouth daily      atorvastatin (LIPITOR) 40 mg tablet Take 1 tablet (40 mg total) by mouth every evening  Qty: 30 tablet, Refills: 0    Associated Diagnoses: Stroke-like symptom      cloNIDine (CATAPRES-TTS-3) 0.3 mg/24 hr 0.3 patches      losartan (COZAAR) 50 mg tablet Take 50 mg by mouth daily      clopidogrel (PLAVIX) 75 mg tablet Take 1 tablet (75 mg total) by mouth daily  Qty: 30 tablet, Refills: 0    Associated Diagnoses: Stroke-like symptom             No discharge procedures on file.    PDMP Review       None            ED Provider  Electronically Signed by             Maggy Coffey PA-C  05/02/24 7761

## 2025-03-24 ENCOUNTER — TELEPHONE (OUTPATIENT)
Dept: UROLOGY | Age: 70
End: 2025-03-24
Payer: MEDICARE

## 2025-03-24 NOTE — TELEPHONE ENCOUNTER
PATIENT CALLED AND SAID HE HAD A PROSTATE BIOPSY, AND DR. KINGSTON CALLED HIM WITH THE RESULTS ON FRIDAY.    HE SAID THE RESULTS ARE NOT ON MY CHART, AND HE WANTS TO KNOW IF THEY WILL BE PUT ON THERE, OR IF WE CAN GET THEM PUT ON THERE.     HE WOULD LIKE TO HAVE A COPY OF THE RESULTS.  HIS DAUGHTER IS A DOCTOR.  HE WANTS TO BE ABLE TO REVIEW THE RESULTS, SO HE HAS AN IDEA OF WHAT HE WANTS TO DO WHEN HE SEES DR. KINGSTON ON 04/11/25.    HE ASKED FOR NURSE TO CALL HIM.    #[phone]

## 2025-04-07 PROBLEM — C61 PROSTATE CANCER: Status: ACTIVE | Noted: 2025-04-07

## 2025-04-07 NOTE — PROGRESS NOTES
Chief Complaint: Urologic complaint    Subjective         History of Present Illness  Candido Alfredo is a 70 y.o. male           Prostatic adenocarcinoma clinical T1c  ED      Well after biopsy      Voiding ok   Dysuria 1-2.  No straining  On saw palmetto      Worried about erections      Sildenafil 80 mg as needed.   Working okay      No DM    No cardiopulmonary history  Non-smoker  No anticoagulation      No Abdominal surgeries      All longevity in his family.  Parents are 90.      Previous      11/22 0.8, GFR is 97    No history of kidney  stone.    No urologic family history  Father with prostate cancer diagnosed in his 60s.          Prostatic adenocarcinoma clinical T1c      3/20/2025 MRI biopsy  Right apex 3+4, 2/2, 20%  Left apex-3+3, 2/2, 21%  Right mid-3+3, 2/2, 14%  Left mid-4+3, 1/2, 5%  Left base-3+4, 2/2, 13%  Region of interest - 3+3, 3/3, 21%   Region of interest - 3+4, 3/3, 20%    2/6/2025 MRI prostate - 37 g, PSAd 0.14    PI - RADS 4 - right posterior lateral peripheral zone apex 1.2 x 1.4 cm  PI - RADS 4 - right posterior lateral peripheral zone mid - 0.9 x 0.8 cm  No KATY      10/24     5.3, percent free 15(23% chance)  1/23       4.0  10/21     5.0              Objective            Assessment and Plan    Diagnoses and all orders for this visit:    1. Prostate cancer (Primary)             Prostatic adenocarcinoma clinical T1c      Today in clinic the patient was counseled on the risk and benefits of laparoscopic robotic prostatectomy.  All risk and benefits were discussed including but not limited to the risk of bleeding, infection, damage to adjacent structures, anesthetic complications and all other complications up to and including death.         We also discussed the alternatives of laparoscopic robotic prostatectomy including the risk and benefits of each.  This included but was not limited to brachy therapy, external beam radiation therapy, open prostatectomy, active surveillance and  also watchful waiting.         We also discussed today in clinic the side effects of surgery including erectile dysfunction and urinary incontinence.  The patient understands that his erections will not be as good as they are now after surgery.  We also discussed he may get no erections after surgery.  We also discussed that the patient will leak urine after surgery and this gets better over time usually taking a year to see a new baseline continence. The treatments of these were also discussed.  Patient understands these risks and acknowledges understanding.         We also discussed radiation therapy and encouraged the patient to seek an opinion a radiation oncologist.  We discussed the different radiation modalities including IMRTand brachytherapy.  We discussed a 10 year outcomes from radiation and surgery appears similar.  We discussed the risk of radiation including erectile dysfunction, radiation cystitis, proctitis and secondary malignancies.         We also discussed alternative therapies including HIFU and cryotherapy.  We discussed these or not currently NCCN guidelines and are not considered standard of care currently.       Patient is still deciding, he is possibly leaning toward surgery but not sure.  Referral to  radiation oncology follow-up after  We did discuss possibility of 6 months of Lupron/ADT with radiation but not for surgery.  Risk and benefits discussed today    Given him several dates he is vacationing until late June and may do this after vacation  Handouts given      ED      Continue sildenafil prn        50 Minutes was used in counseling, coordination of care

## 2025-04-11 ENCOUNTER — TELEPHONE (OUTPATIENT)
Dept: UROLOGY | Age: 70
End: 2025-04-11

## 2025-04-11 ENCOUNTER — OFFICE VISIT (OUTPATIENT)
Dept: UROLOGY | Age: 70
End: 2025-04-11
Payer: MEDICARE

## 2025-04-11 ENCOUNTER — PREP FOR SURGERY (OUTPATIENT)
Dept: OTHER | Facility: HOSPITAL | Age: 70
End: 2025-04-11
Payer: MEDICARE

## 2025-04-11 DIAGNOSIS — C61 PROSTATE CANCER: Primary | ICD-10-CM

## 2025-04-11 DIAGNOSIS — C61 PROSTATE CA: Primary | ICD-10-CM

## 2025-04-11 LAB — URINE VOLUME: NORMAL

## 2025-04-11 RX ORDER — SODIUM CHLORIDE 9 MG/ML
40 INJECTION, SOLUTION INTRAVENOUS AS NEEDED
OUTPATIENT
Start: 2025-04-11

## 2025-04-11 RX ORDER — SODIUM CHLORIDE 0.9 % (FLUSH) 0.9 %
10 SYRINGE (ML) INJECTION AS NEEDED
OUTPATIENT
Start: 2025-04-11

## 2025-04-11 RX ORDER — SODIUM CHLORIDE 0.9 % (FLUSH) 0.9 %
3 SYRINGE (ML) INJECTION EVERY 12 HOURS SCHEDULED
OUTPATIENT
Start: 2025-04-11

## 2025-04-11 NOTE — TELEPHONE ENCOUNTER
SPOKE W/ PATIENT AND R/S APPT TO 4/28 PER KESHAV.    PATIENT STATED HE DISCUSSED SURGERY AND POSSIBLE DATES W/ DR. KINGSTON. HE WOULD LIKE TO PROCEED W/ SCHEDULING.

## 2025-04-11 NOTE — TELEPHONE ENCOUNTER
Caller: VINAYAK HESS    Relationship to patient: SELF    Best call back number:     [phone]       Patient is needing: PT HAS F/U APPT 5/13/25 BUT GOT APPT WITH DR KHAN 4/22/25.  PT WOULD LIKE TO SEE DR KINGSTON O/A 4/27/25  PT ALSO IS INTERESTED IN GENETIC TESTING DUE TO PROSTATE CANCER, CAN YOU ASSIST WITH THIS

## 2025-04-22 ENCOUNTER — CONSULT (OUTPATIENT)
Dept: RADIATION ONCOLOGY | Facility: HOSPITAL | Age: 70
End: 2025-04-22
Payer: MEDICARE

## 2025-04-22 VITALS
SYSTOLIC BLOOD PRESSURE: 136 MMHG | TEMPERATURE: 97.6 F | WEIGHT: 239.31 LBS | BODY MASS INDEX: 34.34 KG/M2 | HEART RATE: 67 BPM | RESPIRATION RATE: 20 BRPM | OXYGEN SATURATION: 96 % | DIASTOLIC BLOOD PRESSURE: 78 MMHG

## 2025-04-22 DIAGNOSIS — C61 PROSTATE CANCER: Primary | ICD-10-CM

## 2025-04-22 DIAGNOSIS — R97.20 ELEVATED PROSTATE SPECIFIC ANTIGEN (PSA): ICD-10-CM

## 2025-04-22 PROCEDURE — G0463 HOSPITAL OUTPT CLINIC VISIT: HCPCS | Performed by: RADIOLOGY

## 2025-04-22 NOTE — PROGRESS NOTES
New Patient Office Visit      Encounter Date: 04/22/2025   Patient Name: Candido Alfredo  YOB: 1955   Medical Record Number: [id number]   Primary Diagnosis: Prostate cancer [C61]         Chief Complaint:    Chief Complaint   Patient presents with    Consult       History of Present Illness: Candido Alfredo is a 70-year-old gentleman with unfavorable intermediate risk prostate cancer who is seen in consultation regarding radiotherapy as a component of definitive management.  Mr. Alfredo has been followed for rising PSA.  PSA in October 2024 was 5.3 ng/mL.  He underwent MRI of the prostate on 2/6/2025 revealing a PI-RADS 4 lesion measuring 1.2 x 1.4 cm in the right posterior lateral peripheral zone at the apex.  Additionally, there was a PI-RADS 4 lesion at the posterior lateral peripheral zone at the mid gland measuring 0.9 x 0.8 cm with no extracapsular extension.  Mr. Alfredo reports essentially stable urinary function over the past few years; AUA SS today is 5.  He reports ability to maintain erections sufficient for sexual intercourse but does use 80 mg of sildenafil.  He denies any issues with bowel movements.    Subjective      AUA/IPSS: 5   DAMARIS: 16     Review of Systems: Review of Systems   Constitutional:  Negative for appetite change, fatigue and fever.   Respiratory:  Negative for cough and shortness of breath.    Cardiovascular:  Negative for chest pain and palpitations.   Gastrointestinal:  Negative for anal bleeding, blood in stool and rectal pain.   Genitourinary:  Positive for urgency. Negative for decreased urine volume, difficulty urinating, dysuria, frequency, hematuria, scrotal swelling and testicular pain.        Nocturia x 1  Some leakage/dribbling  Normal stream  Denies incontinence  Erectile Dysfunction   Musculoskeletal:  Positive for back pain (chronic).   Neurological:  Negative for dizziness, weakness, light-headedness and headaches.   Psychiatric/Behavioral:   Negative for self-injury, sleep disturbance and suicidal ideas.        Past Medical History:   Past Medical History:   Diagnosis Date    Arthritis     TAKES CIMZIA    Benign prostatic hyperplasia 2024    Biopisy on march 20,2025    Colon polyp     BENIGN    Elevated PSA     Erectile dysfunction 2015    Take 4 selidifil’s before sex    HL (hearing loss) 2010    Wear hearing aids    Hyperlipidemia 2023    Taking atorvastatin 40 mg one per day    Prostate cancer        Past Surgical History:   Past Surgical History:   Procedure Laterality Date    COLONOSCOPY  5 years ago    COLONOSCOPY N/A 02/13/2024    Procedure: COLONOSCOPY WITH POLYP BIOPSY AND POLYPECTOMY;  Surgeon: Pierce Arndt MD;  Location: [facility] ENDOSCOPY;  Service: Gastroenterology;  Laterality: N/A;  COLON POLYPS, DIVERTICULOSIS    JOINT REPLACEMENT      2009 L KNEE    PROSTATE BIOPSY N/A 3/20/2025    Procedure: MRI fusion transrectal ultrasound-guided prostate biopsy.  Biopsy prostate through rectum;  Surgeon: Sudhir Brown MD;  Location: [facility] MAIN OR;  Service: Urology;  Laterality: N/A;    TONSILLECTOMY      UPPER GASTROINTESTINAL ENDOSCOPY  Na    Slight heitial hernia       Family History:   Family History   Problem Relation Age of Onset    Cancer Mother         Non-hodgkins lymphoma and spinal cord tumors    Cancer Father         Prostrate cancer    Hearing loss Father     Cancer Maternal Aunt     Cancer Paternal Grandfather     Colon cancer Neg Hx     Malig Hyperthermia Neg Hx        Social History:   Social History     Socioeconomic History    Marital status:    Tobacco Use    Smoking status: Never     Passive exposure: Never    Smokeless tobacco: Never   Vaping Use    Vaping status: Never Used   Substance and Sexual Activity    Alcohol use: Not Currently    Drug use: Never    Sexual activity: Yes     Partners: Female     Birth control/protection: None, Hysterectomy       Medications:     Current Outpatient Medications:      atorvastatin (LIPITOR) 40 MG tablet, , Disp: , Rfl:     Cimzia 2 X 200 MG/ML Prefilled Syringe Kit, , Disp: , Rfl:     Diclofenac Epolamine (FLECTOR) 1.3 % patch patch, Apply 1 patch topically to the appropriate area as directed 2 (Two) Times a Day As Needed., Disp: , Rfl:     diclofenac-miSOPROStol (ARTHROTEC 75) 75-0.2 MG EC tablet, Take 1 tablet by mouth Daily. Patient reports taking 75 mg daily, Disp: , Rfl:     loratadine (Claritin) 10 MG tablet, Claritin 10 mg oral tablet take 1 tablet (10 mg) by oral route once daily   Active, Disp: , Rfl:     Saw Palmetto 450 MG capsule, Take 2 capsules by mouth Daily., Disp: , Rfl:     sildenafil (REVATIO) 20 MG tablet, Take 1 tablet by mouth 4 (Four) Times a Day As Needed., Disp: , Rfl:     Vitamin D, Cholecalciferol, (CHOLECALCIFEROL) 10 MCG (400 UNIT) tablet, Take 5 tablets by mouth Daily. Patient takes 1 every other day, Disp: , Rfl:     ciprofloxacin (Cipro) 500 MG tablet, Take 1 tablet by mouth 2 (Two) Times a Day., Disp: 6 tablet, Rfl: 0    ciprofloxacin (Cipro) 500 MG tablet, Take 1 tablet by mouth 2 (Two) Times a Day, Disp: 4 tablet, Rfl: 0    triamcinolone (KENALOG) 0.1 % cream, Apply 1 Application topically to the appropriate area as directed As Needed. (Patient not taking: Reported on 4/22/2025), Disp: , Rfl:     Allergies:   No Known Allergies    Pain: (on a scale of 0-10)   Pain Score    04/22/25 1505   PainSc: 1   Comment: chronic, ongoing, arthritis   PainLoc: Back       Candido Enid Alfredo reports a pain score of 1.  Given his pain assessment as noted, treatment options were discussed and the following options were decided upon as a follow-up plan to address the patient's pain: continuation of current treatment plan for pain.     Advanced Care Plan: N   Quality of Life: 100 - Full Activity    Objective     Physical Exam:   Vital Signs:   Vitals:    04/22/25 1505   BP: 136/78   Pulse: 67   Resp: 20   Temp: 97.6 °F (36.4 °C)   TempSrc: Oral   SpO2: 96%    Weight: 109 kg (239 lb 5 oz)   PainSc: 1   Comment: chronic, ongoing, arthritis   PainLoc: Back     Body mass index is 34.34 kg/m².     Physical Exam  Constitutional:       General: He is not in acute distress.     Appearance: He is normal weight. He is not toxic-appearing.   HENT:      Head: Normocephalic and atraumatic.   Pulmonary:      Effort: Pulmonary effort is normal. No respiratory distress.   Skin:     General: Skin is warm and dry.   Neurological:      General: No focal deficit present.      Mental Status: He is alert and oriented to person, place, and time.      Cranial Nerves: No cranial nerve deficit.      Gait: Gait normal.   Psychiatric:         Mood and Affect: Mood normal.         Behavior: Behavior normal.         Judgment: Judgment normal.         Results:   Radiographs: The results of the MRI of the prostate performed on 2/6/2025.  The pertinent findings are as above in HPI.      Pathology: I personally reviewed the pathology report from the procedure performed on 3/20/2025.  The pertinent findings are as above in HPI.      Labs:   WBC   Date Value Ref Range Status   03/07/2025 6.72 3.40 - 10.80 10*3/mm3 Final     Hemoglobin   Date Value Ref Range Status   03/07/2025 14.8 13.0 - 17.7 g/dL Final     Hematocrit   Date Value Ref Range Status   03/07/2025 44.5 37.5 - 51.0 % Final     Platelets   Date Value Ref Range Status   03/07/2025 251 140 - 450 10*3/mm3 Final       Assessment / Plan      Assessment/Plan:   Candido Alfredo is a 70-year-old gentleman with cT1c cN0 cM0 adenocarcinoma the prostate [place] 4+3 =7, PSA 5.3 ng/mL.  He has no clinical or radiographic evidence of regional or distant metastatic disease.  ECOG 0    I discussed the clinical, radiographic and pathologic findings to date with Mr. Alfredo and his wife.  I explained the role of radiotherapy in the definitive management of unfavorable intermediate risk prostate cancer.  I explained that the standard approach for treatment of  unfavorable intermediate risk prostate cancer with radiotherapy is concurrent androgen deprivation therapy.  I explained the risks, potential benefits and alternatives of this approach including surgery.  I explained that surgery (prostatectomy) is an excellent option for gentleman with unfavorable intermediate risk prostate cancer that is confined to the prostate gland.  I explained that this is an excellent option for gentleman who have no major medical comorbidities as well.  I recommended revisiting his surgical options with Dr. Brown.  Mr. Alfredo did seem interested in external beam radiotherapy as definitive management in which case I would recommend moderately hypofractionated external beam radiotherapy if he did opt for radiotherapy.  He was encouraged to contact me with any questions or concerns regarding his care.        Brock Foreman MD  Radiation Oncology  [facility]    This document has been signed by Brock Foreman MD on April 24, 2025 08:29 EDT

## 2025-04-22 NOTE — LETTER
April 24, 2025     Sudhir Brown MD  [address] [place] [id number]    Patient: Candido Alfredo   YOB: 1955   Date of Visit: 4/22/2025     Dear Sudhir Brown MD:       Thank you for referring Candido Alfredo to me for evaluation. Below are the relevant portions of my assessment and plan of care.    If you have questions, please do not hesitate to call me.          Sincerely,        rBock Foreman MD        CC: MD Ahsan Beth, Brock ORTIZ MD  04/24/25 0830  Addendum       New Patient Office Visit      Encounter Date: 04/22/2025   Patient Name: Candido Alfredo  YOB: 1955   Medical Record Number: [id number]   Primary Diagnosis: Prostate cancer [C61]         Chief Complaint:    Chief Complaint   Patient presents with   • Consult       History of Present Illness: Candido Alfredo is a 70-year-old gentleman with unfavorable intermediate risk prostate cancer who is seen in consultation regarding radiotherapy as a component of definitive management.  Mr. Alfredo has been followed for rising PSA.  PSA in October 2024 was 5.3 ng/mL.  He underwent MRI of the prostate on 2/6/2025 revealing a PI-RADS 4 lesion measuring 1.2 x 1.4 cm in the right posterior lateral peripheral zone at the apex.  Additionally, there was a PI-RADS 4 lesion at the posterior lateral peripheral zone at the mid gland measuring 0.9 x 0.8 cm with no extracapsular extension.  Mr. Alfredo reports essentially stable urinary function over the past few years; AUA SS today is 5.  He reports ability to maintain erections sufficient for sexual intercourse but does use 80 mg of sildenafil.  He denies any issues with bowel movements.    Subjective      AUA/IPSS: 5   DAMARIS: 16     Review of Systems: Review of Systems   Constitutional:  Negative for appetite change, fatigue and fever.   Respiratory:  Negative for cough and shortness of breath.    Cardiovascular:  Negative for  chest pain and palpitations.   Gastrointestinal:  Negative for anal bleeding, blood in stool and rectal pain.   Genitourinary:  Positive for urgency. Negative for decreased urine volume, difficulty urinating, dysuria, frequency, hematuria, scrotal swelling and testicular pain.        Nocturia x 1  Some leakage/dribbling  Normal stream  Denies incontinence  Erectile Dysfunction   Musculoskeletal:  Positive for back pain (chronic).   Neurological:  Negative for dizziness, weakness, light-headedness and headaches.   Psychiatric/Behavioral:  Negative for self-injury, sleep disturbance and suicidal ideas.        Past Medical History:   Past Medical History:   Diagnosis Date   • Arthritis     TAKES CIMZIA   • Benign prostatic hyperplasia 2024    Biopisy on march 20,2025   • Colon polyp     BENIGN   • Elevated PSA    • Erectile dysfunction 2015    Take 4 selidifil’s before sex   • HL (hearing loss) 2010    Wear hearing aids   • Hyperlipidemia 2023    Taking atorvastatin 40 mg one per day   • Prostate cancer        Past Surgical History:   Past Surgical History:   Procedure Laterality Date   • COLONOSCOPY  5 years ago   • COLONOSCOPY N/A 02/13/2024    Procedure: COLONOSCOPY WITH POLYP BIOPSY AND POLYPECTOMY;  Surgeon: Pierce Arndt MD;  Location: [facility] ENDOSCOPY;  Service: Gastroenterology;  Laterality: N/A;  COLON POLYPS, DIVERTICULOSIS   • JOINT REPLACEMENT      2009 L KNEE   • PROSTATE BIOPSY N/A 3/20/2025    Procedure: MRI fusion transrectal ultrasound-guided prostate biopsy.  Biopsy prostate through rectum;  Surgeon: Sudhir Brown MD;  Location: [facility] MAIN OR;  Service: Urology;  Laterality: N/A;   • TONSILLECTOMY     • UPPER GASTROINTESTINAL ENDOSCOPY  Na    Slight heitial hernia       Family History:   Family History   Problem Relation Age of Onset   • Cancer Mother         Non-hodgkins lymphoma and spinal cord tumors   • Cancer Father         Prostrate cancer   • Hearing loss Father    • Cancer  Maternal Aunt    • Cancer Paternal Grandfather    • Colon cancer Neg Hx    • Malig Hyperthermia Neg Hx        Social History:   Social History     Socioeconomic History   • Marital status:    Tobacco Use   • Smoking status: Never     Passive exposure: Never   • Smokeless tobacco: Never   Vaping Use   • Vaping status: Never Used   Substance and Sexual Activity   • Alcohol use: Not Currently   • Drug use: Never   • Sexual activity: Yes     Partners: Female     Birth control/protection: None, Hysterectomy       Medications:     Current Outpatient Medications:   •  atorvastatin (LIPITOR) 40 MG tablet, , Disp: , Rfl:   •  Cimzia 2 X 200 MG/ML Prefilled Syringe Kit, , Disp: , Rfl:   •  Diclofenac Epolamine (FLECTOR) 1.3 % patch patch, Apply 1 patch topically to the appropriate area as directed 2 (Two) Times a Day As Needed., Disp: , Rfl:   •  diclofenac-miSOPROStol (ARTHROTEC 75) 75-0.2 MG EC tablet, Take 1 tablet by mouth Daily. Patient reports taking 75 mg daily, Disp: , Rfl:   •  loratadine (Claritin) 10 MG tablet, Claritin 10 mg oral tablet take 1 tablet (10 mg) by oral route once daily   Active, Disp: , Rfl:   •  Saw Palmetto 450 MG capsule, Take 2 capsules by mouth Daily., Disp: , Rfl:   •  sildenafil (REVATIO) 20 MG tablet, Take 1 tablet by mouth 4 (Four) Times a Day As Needed., Disp: , Rfl:   •  Vitamin D, Cholecalciferol, (CHOLECALCIFEROL) 10 MCG (400 UNIT) tablet, Take 5 tablets by mouth Daily. Patient takes 1 every other day, Disp: , Rfl:   •  ciprofloxacin (Cipro) 500 MG tablet, Take 1 tablet by mouth 2 (Two) Times a Day., Disp: 6 tablet, Rfl: 0  •  ciprofloxacin (Cipro) 500 MG tablet, Take 1 tablet by mouth 2 (Two) Times a Day, Disp: 4 tablet, Rfl: 0  •  triamcinolone (KENALOG) 0.1 % cream, Apply 1 Application topically to the appropriate area as directed As Needed. (Patient not taking: Reported on 4/22/2025), Disp: , Rfl:     Allergies:   No Known Allergies    Pain: (on a scale of 0-10)   Pain Score     04/22/25 1505   PainSc: 1   Comment: chronic, ongoing, arthritis   PainLoc: Back       Candido Alfredo reports a pain score of 1.  Given his pain assessment as noted, treatment options were discussed and the following options were decided upon as a follow-up plan to address the patient's pain: continuation of current treatment plan for pain.     Advanced Care Plan: N   Quality of Life: 100 - Full Activity    Objective     Physical Exam:   Vital Signs:   Vitals:    04/22/25 1505   BP: 136/78   Pulse: 67   Resp: 20   Temp: 97.6 °F (36.4 °C)   TempSrc: Oral   SpO2: 96%   Weight: 109 kg (239 lb 5 oz)   PainSc: 1   Comment: chronic, ongoing, arthritis   PainLoc: Back     Body mass index is 34.34 kg/m².     Physical Exam  Constitutional:       General: He is not in acute distress.     Appearance: He is normal weight. He is not toxic-appearing.   HENT:      Head: Normocephalic and atraumatic.   Pulmonary:      Effort: Pulmonary effort is normal. No respiratory distress.   Skin:     General: Skin is warm and dry.   Neurological:      General: No focal deficit present.      Mental Status: He is alert and oriented to person, place, and time.      Cranial Nerves: No cranial nerve deficit.      Gait: Gait normal.   Psychiatric:         Mood and Affect: Mood normal.         Behavior: Behavior normal.         Judgment: Judgment normal.         Results:   Radiographs: The results of the MRI of the prostate performed on 2/6/2025.  The pertinent findings are as above in HPI.      Pathology: I personally reviewed the pathology report from the procedure performed on 3/20/2025.  The pertinent findings are as above in HPI.      Labs:   WBC   Date Value Ref Range Status   03/07/2025 6.72 3.40 - 10.80 10*3/mm3 Final     Hemoglobin   Date Value Ref Range Status   03/07/2025 14.8 13.0 - 17.7 g/dL Final     Hematocrit   Date Value Ref Range Status   03/07/2025 44.5 37.5 - 51.0 % Final     Platelets   Date Value Ref Range Status    03/07/2025 871 338 - 248 10*3/mm3 Final       Assessment / Plan      Assessment/Plan:   Candido Alfredo is a 70-year-old gentleman with cT1c cN0 cM0 adenocarcinoma the prostate [place] 4+3 =7, PSA 5.3 ng/mL.  He has no clinical or radiographic evidence of regional or distant metastatic disease.  ECOG 0    I discussed the clinical, radiographic and pathologic findings to date with Mr. Alfredo and his wife.  I explained the role of radiotherapy in the definitive management of unfavorable intermediate risk prostate cancer.  I explained that the standard approach for treatment of unfavorable intermediate risk prostate cancer with radiotherapy is concurrent androgen deprivation therapy.  I explained the risks, potential benefits and alternatives of this approach including surgery.  I explained that surgery (prostatectomy) is an excellent option for gentleman with unfavorable intermediate risk prostate cancer that is confined to the prostate gland.  I explained that this is an excellent option for gentleman who have no major medical comorbidities as well.  I recommended revisiting his surgical options with Dr. Brown.  Mr. Alfredo did seem interested in external beam radiotherapy as definitive management in which case I would recommend moderately hypofractionated external beam radiotherapy if he did opt for radiotherapy.  He was encouraged to contact me with any questions or concerns regarding his care.        Brock Foreman MD  Radiation Oncology  [facility]    This document has been signed by Brock Foreman MD on April 24, 2025 08:29 EDT

## 2025-04-25 NOTE — PROGRESS NOTES
Chief Complaint: Urologic complaint    Subjective         History of Present Illness  Candido Alfredo is a 70 y.o. male           Prostatic adenocarcinoma clinical T1c  ED        No change in his medical history.       Voiding ok   Dysuria 1-2.  No straining  On saw palmetto      Worried about erections  Sildenafil 80 mg as needed.   Working okay - no change      No DM    No cardiopulmonary history  Non-smoker  No anticoagulation      No Abdominal surgeries      All longevity in his family.  Parents are 90.            Previous      11/22 0.8, GFR is 97    No history of kidney  stone.    No urologic family history  Father with prostate cancer diagnosed in his 60s.          Prostatic adenocarcinoma clinical T1c      3/20/2025 MRI biopsy  Right apex 3+4, 2/2, 20%  Left apex-3+3, 2/2, 21%  Right mid-3+3, 2/2, 14%  Left mid-4+3, 1/2, 5%  Left base-3+4, 2/2, 13%  Region of interest - 3+3, 3/3, 21%   Region of interest - 3+4, 3/3, 20%    2/6/2025 MRI prostate - 37 g, PSAd 0.14    PI - RADS 4 - right posterior lateral peripheral zone apex 1.2 x 1.4 cm  PI - RADS 4 - right posterior lateral peripheral zone mid - 0.9 x 0.8 cm  No KATY      10/24     5.3, percent free 15(23% chance)  1/23       4.0  10/21     5.0              Objective            Assessment and Plan    Diagnoses and all orders for this visit:    1. Prostate cancer (Primary)             Prostatic adenocarcinoma clinical T1c      Patient is considering a tertiary referral with the [facility].  We will go ahead and see if we can help him get records.  He is considering prostatectomy      I did discuss if he does decide to go ahead with prostatectomy at [place] we will go ahead and follow him here after if he needs us to          ED      Continue sildenafil prn

## 2025-04-28 ENCOUNTER — OFFICE VISIT (OUTPATIENT)
Dept: UROLOGY | Age: 70
End: 2025-04-28
Payer: MEDICARE

## 2025-04-28 VITALS — WEIGHT: 237.3 LBS | HEIGHT: 70 IN | BODY MASS INDEX: 33.97 KG/M2

## 2025-04-28 DIAGNOSIS — C61 PROSTATE CANCER: Primary | ICD-10-CM

## 2025-05-02 ENCOUNTER — TELEPHONE (OUTPATIENT)
Dept: INTERNAL MEDICINE | Facility: CLINIC | Age: 70
End: 2025-05-02
Payer: MEDICARE

## 2025-05-02 DIAGNOSIS — C61 PROSTATE CANCER: Primary | ICD-10-CM

## 2025-05-02 NOTE — TELEPHONE ENCOUNTER
Caller: Candido Alfredo Hascal    Relationship: Self    Best call back number: [phone]     Requested Prescriptions:   Requested Prescriptions     Pending Prescriptions Disp Refills    diclofenac-miSOPROStol (ARTHROTEC 75) 75-0.2 MG EC tablet       Sig: Take 1 tablet by mouth Daily.        Pharmacy where request should be sent: [facility] - [address] - [address] AT [address] TO [facility] - [phone]  - [phone]      Last office visit with prescribing clinician: 3/7/2025   Last telemedicine visit with prescribing clinician: Visit date not found   Next office visit with prescribing clinician: 7/10/2025     Additional details provided by patient: PATIENT HAS TWO WEEKS LEFT OF MEDICATION.    Does the patient have less than a 3 day supply:  [] Yes  [x] No    Would you like a call back once the refill request has been completed: [] Yes [] No    If the office needs to give you a call back, can they leave a voicemail: [] Yes [] No    Yovany Reid Rep   05/02/25 16:10 EDT

## 2025-05-02 NOTE — TELEPHONE ENCOUNTER
Caller: Candido Alfredo Hascal    Relationship: Self    Best call back number: [phone]     What form or medical record are you requesting: ALL RECENT BLOOD WORK    Who is requesting this form or medical record from you: [facility]    How would you like to receive the form or medical records (pick-up, mail, fax): UPLOAD TO [employer]    Timeframe paperwork needed: AS SOON AS POSSIBLE    Additional notes: PATIENT IS NEEDING TO HAVE AN ORDER PLACED FOR PSA TEST AS WELL. PATIENT STATES THAT HE HAS AN APPOINTMENT WITH THE [facility] 05.29.2025. PATIENT WILL BE LEAVING [facility] ON 05.16.2025. PATIENT HAS PROSTATE CANCER.

## 2025-05-05 RX ORDER — DICLOFENAC SODIUM AND MISOPROSTOL 75; 200 MG/1; UG/1
1 TABLET, DELAYED RELEASE ORAL DAILY
Qty: 90 TABLET | Refills: 1 | Status: SHIPPED | OUTPATIENT
Start: 2025-05-05

## 2025-05-07 NOTE — TELEPHONE ENCOUNTER
He should be able to see all the blood work performed at [facility] facility on MyChart.  Please advise patient how he can get access to results of scanned labs.     PSA order placed.

## 2025-05-07 NOTE — TELEPHONE ENCOUNTER
Spoke with patient rely message, pt verbalized understanding. Patient requested hard copy of labs to take with him.

## 2025-05-09 LAB
CYTO UR: NORMAL
LAB AP CASE REPORT: NORMAL
LAB AP CLINICAL INFORMATION: NORMAL
PATH REPORT.ADDENDUM SPEC: NORMAL
PATH REPORT.FINAL DX SPEC: NORMAL
PATH REPORT.GROSS SPEC: NORMAL

## 2025-05-12 ENCOUNTER — LAB (OUTPATIENT)
Dept: LAB | Facility: HOSPITAL | Age: 70
End: 2025-05-12
Payer: MEDICARE

## 2025-05-12 DIAGNOSIS — C61 PROSTATE CANCER: ICD-10-CM

## 2025-05-12 LAB — PSA SERPL-MCNC: 4.54 NG/ML (ref 0–4)

## 2025-05-12 PROCEDURE — 84153 ASSAY OF PSA TOTAL: CPT

## 2025-05-12 PROCEDURE — 36415 COLL VENOUS BLD VENIPUNCTURE: CPT

## 2025-06-05 ENCOUNTER — OFFICE VISIT (OUTPATIENT)
Dept: INTERNAL MEDICINE | Facility: CLINIC | Age: 70
End: 2025-06-05
Payer: MEDICARE

## 2025-06-05 VITALS
WEIGHT: 241.6 LBS | DIASTOLIC BLOOD PRESSURE: 78 MMHG | OXYGEN SATURATION: 96 % | HEIGHT: 70 IN | HEART RATE: 72 BPM | SYSTOLIC BLOOD PRESSURE: 120 MMHG | TEMPERATURE: 97.2 F | BODY MASS INDEX: 34.59 KG/M2 | RESPIRATION RATE: 16 BRPM

## 2025-06-05 DIAGNOSIS — M79.672 LEFT FOOT PAIN: Primary | ICD-10-CM

## 2025-06-05 PROCEDURE — 1159F MED LIST DOCD IN RCRD: CPT | Performed by: NURSE PRACTITIONER

## 2025-06-05 PROCEDURE — 1160F RVW MEDS BY RX/DR IN RCRD: CPT | Performed by: NURSE PRACTITIONER

## 2025-06-05 PROCEDURE — 1125F AMNT PAIN NOTED PAIN PRSNT: CPT | Performed by: NURSE PRACTITIONER

## 2025-06-05 PROCEDURE — 99213 OFFICE O/P EST LOW 20 MIN: CPT | Performed by: NURSE PRACTITIONER

## 2025-06-05 NOTE — PROGRESS NOTES
"Chief Complaint  Foot Pain (Left foot pain, symptoms started 5/17/25, does swell. Patient does have reactive arthritis. )    Subjective      Candido Alfredo is a 70 y.o. male who presents to [facility]     Patient reports left foot pain > 2 weeks. Denies direct injury or increase in physical activity. Pain does not improve with Voltaren gel. Pain is dull, exacerbated by weight bearing. Denies pain when at rest, worse on steps and with standing. Predominantly in the arch. Denies erythema, edema. Had swelling initially, this has since resolved. Daily NSAID helps some. He would like to pursue imaging today.     Objective   Vital Signs:   Vitals:    06/05/25 1529   BP: 120/78   BP Location: Right arm   Patient Position: Sitting   Cuff Size: Adult   Pulse: 72   Resp: 16   Temp: 97.2 °F (36.2 °C)   TempSrc: Temporal   SpO2: 96%   Weight: 110 kg (241 lb 9.6 oz)   Height: 177.8 cm (70\")     Body mass index is 34.67 kg/m².    Wt Readings from Last 3 Encounters:   06/05/25 110 kg (241 lb 9.6 oz)   04/28/25 108 kg (237 lb 4.8 oz)   04/22/25 109 kg (239 lb 5 oz)     BP Readings from Last 3 Encounters:   06/05/25 120/78   04/22/25 136/78   03/20/25 111/80       Health Maintenance   Topic Date Due    Pneumococcal Vaccine 50+ (1 of 1 - PCV) Never done    ZOSTER VACCINE (2 of 2) 11/21/2020    HEPATITIS C SCREENING  Never done    COVID-19 Vaccine (8 - 2024-25 season) 03/04/2025    INFLUENZA VACCINE  07/01/2025    ANNUAL WELLNESS VISIT  10/20/2025    LIPID PANEL  03/07/2026    COLORECTAL CANCER SCREENING  02/13/2029    TDAP/TD VACCINES (2 - Td or Tdap) 08/29/2032       Physical Exam  Constitutional:       Appearance: Normal appearance.   HENT:      Head: Normocephalic and atraumatic.      Nose: Nose normal.      Mouth/Throat:      Mouth: Mucous membranes are moist.      Pharynx: Oropharynx is clear.   Eyes:      Extraocular Movements: Extraocular movements intact.      " Conjunctiva/sclera: Conjunctivae normal.      Pupils: Pupils are equal, round, and reactive to light.   Cardiovascular:      Rate and Rhythm: Normal rate and regular rhythm.      Heart sounds: Normal heart sounds.   Pulmonary:      Effort: Pulmonary effort is normal.      Breath sounds: Normal breath sounds.   Musculoskeletal:        Feet:    Skin:     General: Skin is warm and dry.   Neurological:      General: No focal deficit present.      Mental Status: He is alert and oriented to person, place, and time.   Psychiatric:         Mood and Affect: Mood normal.         Behavior: Behavior normal.         Thought Content: Thought content normal.          Result Review :  The following data was reviewed by: MARISELA Magallon on 06/05/2025:         Procedures          Assessment & Plan  Left foot pain  Xray today. Discussed option for podiatry referral and/or short course of systemic steroids, including potential side effects. Patient will consider based on results of imaging.   Orders:    XR Foot 3+ View Left (In Office)           FOLLOW UP  Return if symptoms worsen or fail to improve.  Patient was given instructions and counseling regarding his condition or for health maintenance advice. Please see specific information pulled into the AVS if appropriate.       MARISELA Magallon  06/06/25  17:31 EDT    CURRENT & DISCONTINUED MEDICATIONS  Current Outpatient Medications   Medication Instructions    atorvastatin (LIPITOR) 40 MG tablet     Cimzia 2 X 200 MG/ML Prefilled Syringe Kit     Diclofenac Epolamine (FLECTOR) 1.3 % patch patch 1 patch, 2 Times Daily PRN    diclofenac-miSOPROStol (ARTHROTEC 75) 75-0.2 MG EC tablet 1 tablet, Oral, Daily    loratadine (Claritin) 10 MG tablet Claritin 10 mg oral tablet take 1 tablet (10 mg) by oral route once daily   Active    Saw Palmetto 450 MG capsule 2 capsules, Daily    sildenafil (REVATIO) 20 mg, 4 Times Daily PRN    Vitamin D (Cholecalciferol) (CHOLECALCIFEROL) 2,000 Units,  Daily       Medications Discontinued During This Encounter   Medication Reason    ciprofloxacin (Cipro) 500 MG tablet     triamcinolone (KENALOG) 0.1 % cream     ciprofloxacin (Cipro) 500 MG tablet

## 2025-06-06 ENCOUNTER — TELEPHONE (OUTPATIENT)
Dept: INTERNAL MEDICINE | Facility: CLINIC | Age: 70
End: 2025-06-06
Payer: MEDICARE

## 2025-06-06 DIAGNOSIS — M79.672 LEFT FOOT PAIN: Primary | ICD-10-CM

## 2025-06-06 NOTE — TELEPHONE ENCOUNTER
Patient called, said he had a recent appointment and would like to go to podiatry for foot pain for possible injections. Patient stated he needs to be seen within the next week. Please advise.

## 2025-06-11 ENCOUNTER — TRANSCRIBE ORDERS (OUTPATIENT)
Dept: ADMINISTRATIVE | Facility: HOSPITAL | Age: 70
End: 2025-06-11
Payer: MEDICARE

## 2025-06-11 DIAGNOSIS — C61 PROSTATE CANCER: Primary | ICD-10-CM

## 2025-06-20 ENCOUNTER — TRANSCRIBE ORDERS (OUTPATIENT)
Dept: ADMINISTRATIVE | Facility: HOSPITAL | Age: 70
End: 2025-06-20
Payer: MEDICARE

## 2025-06-20 DIAGNOSIS — C61 PROSTATE CANCER: Primary | ICD-10-CM

## 2025-07-08 NOTE — TELEPHONE ENCOUNTER
[facility]-CoA Reductase Inhibitors (Statins) Protocol [id number]/08/2025 09:13 AM   Protocol Details Previous prescriber was not Historical.

## 2025-07-08 NOTE — TELEPHONE ENCOUNTER
Caller: DEENA HESS    Relationship: Emergency Contact    Best call back number: [phone]    Requested Prescriptions:   Requested Prescriptions     Pending Prescriptions Disp Refills    atorvastatin (LIPITOR) 40 MG tablet 90 tablet         Pharmacy where request should be sent: [facility] - [facility] - [address] AT [address] TO [facility] - [phone]  - [phone]     Last office visit with prescribing clinician: 3/7/2025   Last telemedicine visit with prescribing clinician: Visit date not found   Next office visit with prescribing clinician: 9/18/2025     Additional details provided by patient:     Does the patient have less than a 3 day supply:  [x] Yes  [] No        Yovany Landers Rep   07/08/25 09:13 EDT

## 2025-07-09 ENCOUNTER — HOSPITAL ENCOUNTER (OUTPATIENT)
Dept: PET IMAGING | Facility: HOSPITAL | Age: 70
Discharge: HOME | End: 2025-07-09
Payer: MEDICARE

## 2025-07-09 DIAGNOSIS — C61 PROSTATE CANCER: ICD-10-CM

## 2025-07-09 PROCEDURE — A9596 GALLIUM GA 68 GOZETOTIDE 25 MCG KIT: HCPCS | Performed by: INTERNAL MEDICINE

## 2025-07-09 PROCEDURE — 34310000005 GALLIUM GA 68 GOZETOTIDE 25 MCG KIT: Performed by: INTERNAL MEDICINE

## 2025-07-09 PROCEDURE — 78815 PET IMAGE W/CT SKULL-THIGH: CPT

## 2025-07-09 RX ORDER — ATORVASTATIN CALCIUM 40 MG/1
40 TABLET, FILM COATED ORAL NIGHTLY
Qty: 90 TABLET | Refills: 1 | Status: SHIPPED | OUTPATIENT
Start: 2025-07-09

## 2025-07-09 RX ADMIN — KIT FOR THE PREPARATION OF GALLIUM GA 68 GOZETOTIDE INJECTION 1 DOSE: KIT INTRAVENOUS at 08:22

## (undated) DEVICE — Device

## (undated) DEVICE — LINER SURG CANSTR SXN S/RIGD 1500CC

## (undated) DEVICE — SOL IRRG H2O PL/BG 1000ML STRL

## (undated) DEVICE — SHEET,DRAPE,70X85,STERILE: Brand: MEDLINE

## (undated) DEVICE — SNAR POLYP CAPTIFLEX XS/OVL 11X2.4MM 240CM 1P/U

## (undated) DEVICE — MAX-CORE® DISPOSABLE CORE BIOPSY INSTRUMENT, 18G X 25CM: Brand: MAX-CORE

## (undated) DEVICE — KT VLV BIOP DEFENDO SXN AIR/WATER

## (undated) DEVICE — DRSNG TELFA PAD NONADH STR 1S 3X4IN

## (undated) DEVICE — TRAP POLYP ETRAP 2PK

## (undated) DEVICE — TOWEL,OR,DSP,ST,BLUE,STD,4/PK,20PK/CS: Brand: MEDLINE

## (undated) DEVICE — MARKER,SKIN,WI/RULER AND LABELS: Brand: MEDLINE

## (undated) DEVICE — CONN JET HYDRA H20 AUXILIARY DISP

## (undated) DEVICE — SOLIDIFIER LIQLOC PLS 1500CC BT

## (undated) DEVICE — THE STERILE LIGHT HANDLE COVER IS USED WITH STERIS SURGICAL LIGHTING AND VISUALIZATION SYSTEMS.

## (undated) DEVICE — FRCP BX RADJAW4 LG/CAP 2.8 240CM BX80